# Patient Record
Sex: FEMALE | Race: BLACK OR AFRICAN AMERICAN | NOT HISPANIC OR LATINO
[De-identification: names, ages, dates, MRNs, and addresses within clinical notes are randomized per-mention and may not be internally consistent; named-entity substitution may affect disease eponyms.]

---

## 2018-02-01 ENCOUNTER — APPOINTMENT (OUTPATIENT)
Dept: CV DIAGNOSITCS | Facility: HOSPITAL | Age: 73
End: 2018-02-01

## 2018-02-15 ENCOUNTER — OUTPATIENT (OUTPATIENT)
Dept: OUTPATIENT SERVICES | Facility: HOSPITAL | Age: 73
LOS: 1 days | End: 2018-02-15

## 2018-02-15 ENCOUNTER — APPOINTMENT (OUTPATIENT)
Dept: CV DIAGNOSITCS | Facility: HOSPITAL | Age: 73
End: 2018-02-15
Payer: MEDICARE

## 2018-02-15 DIAGNOSIS — I34.0 NONRHEUMATIC MITRAL (VALVE) INSUFFICIENCY: ICD-10-CM

## 2018-02-15 PROCEDURE — 93320 DOPPLER ECHO COMPLETE: CPT | Mod: 26,GC

## 2018-02-15 PROCEDURE — 93312 ECHO TRANSESOPHAGEAL: CPT | Mod: 26

## 2018-02-15 PROCEDURE — 93325 DOPPLER ECHO COLOR FLOW MAPG: CPT | Mod: 26,GC

## 2022-09-02 ENCOUNTER — APPOINTMENT (OUTPATIENT)
Dept: ORTHOPEDIC SURGERY | Facility: CLINIC | Age: 77
End: 2022-09-02

## 2022-09-02 DIAGNOSIS — Z00.00 ENCOUNTER FOR GENERAL ADULT MEDICAL EXAMINATION W/OUT ABNORMAL FINDINGS: ICD-10-CM

## 2022-09-02 PROCEDURE — 99214 OFFICE O/P EST MOD 30 MIN: CPT

## 2022-09-02 PROCEDURE — 73630 X-RAY EXAM OF FOOT: CPT | Mod: RT

## 2022-09-02 PROCEDURE — 73600 X-RAY EXAM OF ANKLE: CPT | Mod: RT

## 2022-09-02 RX ORDER — MELOXICAM 15 MG/1
15 TABLET ORAL DAILY
Qty: 30 | Refills: 3 | Status: COMPLETED | COMMUNITY
Start: 2022-09-02 | End: 2022-12-31

## 2022-09-02 NOTE — HISTORY OF PRESENT ILLNESS
[de-identified] : Patient is a 77 year old female who presents for evaluation of her RT foot/ankle. Reports twisting injury early-mid August 2022. No formal treatment other than compression stockings recommended by vascular specialist. WB in sandals. Ice to affected area. H/O lumbar HNP and radiculopathy, and still complains of intermittent pain down the right leg.

## 2022-09-02 NOTE — ASSESSMENT
[FreeTextEntry1] : Patient was offered PT, but declined. Icing and home ROM exercises are recommended. She will return if symptoms do not improve.  Refill on meloxicam sent.  Icing prn.\par \par She was again recommended to go to pain management regarding her radiculopathy.

## 2022-09-02 NOTE — PHYSICAL EXAM
[NL (40)] : plantar flexion 40 degrees [NL 30)] : inversion 30 degrees [5___] : eversion 5[unfilled]/5 [2+] : posterior tibialis pulse: 2+ [Normal] : saphenous nerve sensation normal [] : no pain when stressing lateral tarsal metatarsal joint [Right] : right foot [Weight -] : weightbearing [There are no fractures, subluxations or dislocations. No significant abnormalities are seen] : There are no fractures, subluxations or dislocations. No significant abnormalities are seen [de-identified] : MIld midfoot djd [de-identified] : eversion 15 degrees [TWNoteComboBox7] : dorsiflexion 15 degrees

## 2022-09-16 ENCOUNTER — APPOINTMENT (OUTPATIENT)
Dept: PAIN MANAGEMENT | Facility: CLINIC | Age: 77
End: 2022-09-16

## 2022-09-16 VITALS — BODY MASS INDEX: 36.96 KG/M2 | WEIGHT: 230 LBS | HEIGHT: 66 IN

## 2022-09-16 PROCEDURE — 99204 OFFICE O/P NEW MOD 45 MIN: CPT

## 2022-09-16 NOTE — DISCUSSION/SUMMARY
[de-identified] : After discussing various treatment options with the patient including but not limited to oral medications, physical therapy, exercise modalities as well as interventional spinal injections, we have decided with the following plan:\par \par - Continue Home exercises, stretching, activity modification, physical therapy, and conservative care.\par - MRI report and/or images was reviewed and discussed with the patient.\par - Recommend L4-5 Lumbar Epidural Steroid Injection under fluoroscopic guidance with image.\par - The risks, benefits and alternatives of the proposed procedure were explained in detail with the patient. The risks outlined include but are not limited to infection, bleeding, post-dural puncture headache, nerve injury, a temporary increase in pain, failure to resolve symptoms, allergic reaction, symptom recurrence, and possible elevation of blood sugar in diabetics. All questions were answered to patient's apparent satisfaction and he/she verbalized an understanding.\par - Patient is presenting with acute/sub-acute radicular pain with impairment in ADLs and functionality.  The pain has not responded to conservative care including NSAID therapy and/or physical therapy.  There is no bleeding tendency, unstable medical condition, or systemic infection.\par - Follow up in 1-2 weeks post injection for re-evaluation.\par - Will call to schedule.\par *pt takes 81mg Aspirin\par  no

## 2022-09-16 NOTE — HISTORY OF PRESENT ILLNESS
[Lower back] : lower back [6] : 6 [4] : 4 [Dull/Aching] : dull/aching [Radiating] : radiating [Tightness] : tightness [Meds] : meds [Physical therapy] : physical therapy [Walking] : walking [Stairs] : stairs [] : no [FreeTextEntry1] : b/l  [FreeTextEntry6] : heavyness [FreeTextEntry7] : (b/l) mostly left groin, thigh to the knee  [FreeTextEntry9] : meloxicam  [de-identified] : lifting  [de-identified] : L MRI

## 2022-09-16 NOTE — PHYSICAL EXAM
[de-identified] : Constitutional; Appears well, no apparent distress\par Ability to communicate: Normal \par Respiratory: non-labored breathing\par Skin: No rash noted\par Head: Normocephalic, atraumatic\par Neck: no visible thyroid enlargement\par Eyes: Extraocular movements intact\par Neurologic: Alert and oriented x3\par Psychiatric: normal mood, affect and behavior \par \par  [Flexion] : flexion [Extension] : extension [] : light touch intact throughout both lower extremities

## 2022-10-28 LAB — SARS-COV-2 N GENE NPH QL NAA+PROBE: NOT DETECTED

## 2022-10-31 ENCOUNTER — NON-APPOINTMENT (OUTPATIENT)
Age: 77
End: 2022-10-31

## 2022-11-02 ENCOUNTER — APPOINTMENT (OUTPATIENT)
Age: 77
End: 2022-11-02

## 2022-11-02 PROCEDURE — 62323 NJX INTERLAMINAR LMBR/SAC: CPT

## 2022-11-25 ENCOUNTER — APPOINTMENT (OUTPATIENT)
Dept: PAIN MANAGEMENT | Facility: CLINIC | Age: 77
End: 2022-11-25

## 2022-11-25 VITALS — HEIGHT: 66 IN | WEIGHT: 230 LBS | BODY MASS INDEX: 36.96 KG/M2

## 2022-11-25 PROCEDURE — 99213 OFFICE O/P EST LOW 20 MIN: CPT

## 2022-11-25 NOTE — DISCUSSION/SUMMARY
[de-identified] : After discussing various treatment options with the patient including but not limited to oral medications, physical therapy, exercise modalities as well as interventional spinal injections, we have decided with the following plan:\par \par - Continue home exercises, stretching, activity modification, physical therapy, and conservative care.\par - Follow-up as needed.\par - Will provide prescription for Physical Therapy.\par - Recommend continue Meloxicam 15mg daily PRN. Potential adverse effects including but not limited to bleeding, ulcers, increased risk of hypertension, heart disease, kidney disease and stroke were discussed with the patient.  Medication would allow patient to be more mobile and perform ADL's.  Will continue to monitor patient and attempt to wean as soon as possible. Will use the lowest dosage possible for the shortest possible period of time.\par

## 2022-11-25 NOTE — PHYSICAL EXAM
Group Topic: BH Group OT    Date: 7/13/2021  Start Time: 1330  End Time: 1430  Facilitators: Catherine S Fahres, OT    Focus: Communication  Number in attendance: 3    Method: Group  Attendance: Present  Participation: Active  Patient Response: Appropriate feedback  Mood: Depressed  Affect: Type: subdued  Behavior/Socialization: Appropriate to group  Thought Process: Demonstrated insight and Tracking  Task Performance: Follows directions  Patient Evaluation: Independent - full participation       [de-identified] : Constitutional; Appears well, no apparent distress\par Ability to communicate: Normal \par Respiratory: non-labored breathing\par Skin: No rash noted\par Head: Normocephalic, atraumatic\par Neck: no visible thyroid enlargement\par Eyes: Extraocular movements intact\par Neurologic: Alert and oriented x3\par Psychiatric: normal mood, affect and behavior \par \par  [] : lumbar paraspinal tenderness

## 2022-11-25 NOTE — HISTORY OF PRESENT ILLNESS
[Lower back] : lower back [6] : 6 [4] : 4 [Dull/Aching] : dull/aching [Radiating] : radiating [Tightness] : tightness [Meds] : meds [Physical therapy] : physical therapy [Walking] : walking [Stairs] : stairs [] : no [FreeTextEntry1] : b/l  [FreeTextEntry6] : heavyness [FreeTextEntry7] : (b/l) mostly left groin, thigh to the knee  [FreeTextEntry9] : meloxicam  [de-identified] : lifting  [de-identified] : L MRI

## 2023-03-21 ENCOUNTER — NON-APPOINTMENT (OUTPATIENT)
Age: 78
End: 2023-03-21

## 2023-06-23 ENCOUNTER — APPOINTMENT (OUTPATIENT)
Dept: ORTHOPEDIC SURGERY | Facility: CLINIC | Age: 78
End: 2023-06-23
Payer: MEDICARE

## 2023-06-23 PROCEDURE — 99214 OFFICE O/P EST MOD 30 MIN: CPT

## 2023-06-23 PROCEDURE — 73562 X-RAY EXAM OF KNEE 3: CPT | Mod: RT

## 2023-06-23 RX ORDER — MELOXICAM 15 MG/1
15 TABLET ORAL DAILY
Qty: 30 | Refills: 3 | Status: COMPLETED | COMMUNITY
Start: 2023-06-23 | End: 2023-10-21

## 2023-06-23 NOTE — PHYSICAL EXAM
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

## 2023-06-23 NOTE — HISTORY OF PRESENT ILLNESS
[6] : 6 [Dull/Aching] : dull/aching [Sharp] : sharp [de-identified] : Patient is a 78 year old female here for evaluation of right knee. Patient states she has been having pain on her knee since mid June.  No trauma.. She as been taking Meloxicam 15mg.  She has seen  Dr. Angeles for her knee or right knee OA in the past.  WB in sneakers. [FreeTextEntry1] : right knee

## 2023-06-23 NOTE — ASSESSMENT
[FreeTextEntry1] : Steroid injection was offered, but patient declined.\par Meloxicam prescribed.\par The patient was explained the options as well as benefits of over the counter verses prescription strength nonsteroidal anti-inflammatory medication. The patient opts for a prescription strength medication.\par \par \par Follow up with Dr. Solorzano in one month

## 2023-10-27 ENCOUNTER — APPOINTMENT (OUTPATIENT)
Dept: ORTHOPEDIC SURGERY | Facility: CLINIC | Age: 78
End: 2023-10-27
Payer: MEDICARE

## 2023-10-27 DIAGNOSIS — E11.9 TYPE 2 DIABETES MELLITUS W/OUT COMPLICATIONS: ICD-10-CM

## 2023-10-27 DIAGNOSIS — S93.401A SPRAIN OF UNSPECIFIED LIGAMENT OF RIGHT ANKLE, INITIAL ENCOUNTER: ICD-10-CM

## 2023-10-27 DIAGNOSIS — I10 ESSENTIAL (PRIMARY) HYPERTENSION: ICD-10-CM

## 2023-10-27 DIAGNOSIS — E78.00 PURE HYPERCHOLESTEROLEMIA, UNSPECIFIED: ICD-10-CM

## 2023-10-27 PROCEDURE — 73610 X-RAY EXAM OF ANKLE: CPT | Mod: RT

## 2023-10-27 PROCEDURE — 99214 OFFICE O/P EST MOD 30 MIN: CPT

## 2023-10-27 RX ORDER — METOPROLOL SUCCINATE 100 MG/1
100 TABLET, EXTENDED RELEASE ORAL
Refills: 0 | Status: ACTIVE | COMMUNITY

## 2023-10-27 RX ORDER — METFORMIN HYDROCHLORIDE 625 MG/1
TABLET ORAL
Refills: 0 | Status: ACTIVE | COMMUNITY

## 2023-10-27 RX ORDER — ATORVASTATIN CALCIUM 80 MG/1
TABLET, FILM COATED ORAL
Refills: 0 | Status: ACTIVE | COMMUNITY

## 2023-12-01 ENCOUNTER — APPOINTMENT (OUTPATIENT)
Dept: ORTHOPEDIC SURGERY | Facility: CLINIC | Age: 78
End: 2023-12-01
Payer: MEDICARE

## 2023-12-01 PROCEDURE — 99213 OFFICE O/P EST LOW 20 MIN: CPT

## 2023-12-05 ENCOUNTER — EMERGENCY (EMERGENCY)
Facility: HOSPITAL | Age: 78
LOS: 0 days | Discharge: ANOTHER HOSPITAL | End: 2023-12-06
Attending: EMERGENCY MEDICINE
Payer: MEDICARE

## 2023-12-05 VITALS
RESPIRATION RATE: 18 BRPM | HEIGHT: 66 IN | DIASTOLIC BLOOD PRESSURE: 92 MMHG | SYSTOLIC BLOOD PRESSURE: 176 MMHG | TEMPERATURE: 99 F | HEART RATE: 100 BPM | OXYGEN SATURATION: 97 % | WEIGHT: 216.05 LBS

## 2023-12-05 DIAGNOSIS — Z98.890 OTHER SPECIFIED POSTPROCEDURAL STATES: ICD-10-CM

## 2023-12-05 DIAGNOSIS — Z79.82 LONG TERM (CURRENT) USE OF ASPIRIN: ICD-10-CM

## 2023-12-05 DIAGNOSIS — Z86.2 PERSONAL HISTORY OF DISEASES OF THE BLOOD AND BLOOD-FORMING ORGANS AND CERTAIN DISORDERS INVOLVING THE IMMUNE MECHANISM: ICD-10-CM

## 2023-12-05 DIAGNOSIS — K62.5 HEMORRHAGE OF ANUS AND RECTUM: ICD-10-CM

## 2023-12-05 DIAGNOSIS — I10 ESSENTIAL (PRIMARY) HYPERTENSION: ICD-10-CM

## 2023-12-05 DIAGNOSIS — E78.5 HYPERLIPIDEMIA, UNSPECIFIED: ICD-10-CM

## 2023-12-05 DIAGNOSIS — Z88.0 ALLERGY STATUS TO PENICILLIN: ICD-10-CM

## 2023-12-05 DIAGNOSIS — Z79.02 LONG TERM (CURRENT) USE OF ANTITHROMBOTICS/ANTIPLATELETS: ICD-10-CM

## 2023-12-05 DIAGNOSIS — K92.2 GASTROINTESTINAL HEMORRHAGE, UNSPECIFIED: ICD-10-CM

## 2023-12-05 DIAGNOSIS — Z87.19 PERSONAL HISTORY OF OTHER DISEASES OF THE DIGESTIVE SYSTEM: ICD-10-CM

## 2023-12-05 LAB
ALBUMIN SERPL ELPH-MCNC: 2.9 G/DL — LOW (ref 3.3–5)
ALBUMIN SERPL ELPH-MCNC: 2.9 G/DL — LOW (ref 3.3–5)
ALP SERPL-CCNC: 77 U/L — SIGNIFICANT CHANGE UP (ref 40–120)
ALP SERPL-CCNC: 77 U/L — SIGNIFICANT CHANGE UP (ref 40–120)
ALT FLD-CCNC: 14 U/L — SIGNIFICANT CHANGE UP (ref 12–78)
ALT FLD-CCNC: 14 U/L — SIGNIFICANT CHANGE UP (ref 12–78)
ANION GAP SERPL CALC-SCNC: 6 MMOL/L — SIGNIFICANT CHANGE UP (ref 5–17)
ANION GAP SERPL CALC-SCNC: 6 MMOL/L — SIGNIFICANT CHANGE UP (ref 5–17)
APTT BLD: 27.1 SEC — SIGNIFICANT CHANGE UP (ref 24.5–35.6)
APTT BLD: 27.1 SEC — SIGNIFICANT CHANGE UP (ref 24.5–35.6)
AST SERPL-CCNC: 11 U/L — LOW (ref 15–37)
AST SERPL-CCNC: 11 U/L — LOW (ref 15–37)
BASOPHILS # BLD AUTO: 0 K/UL — SIGNIFICANT CHANGE UP (ref 0–0.2)
BASOPHILS # BLD AUTO: 0 K/UL — SIGNIFICANT CHANGE UP (ref 0–0.2)
BASOPHILS NFR BLD AUTO: 0 % — SIGNIFICANT CHANGE UP (ref 0–2)
BASOPHILS NFR BLD AUTO: 0 % — SIGNIFICANT CHANGE UP (ref 0–2)
BILIRUB SERPL-MCNC: 0.3 MG/DL — SIGNIFICANT CHANGE UP (ref 0.2–1.2)
BILIRUB SERPL-MCNC: 0.3 MG/DL — SIGNIFICANT CHANGE UP (ref 0.2–1.2)
BUN SERPL-MCNC: 24 MG/DL — HIGH (ref 7–23)
BUN SERPL-MCNC: 24 MG/DL — HIGH (ref 7–23)
CALCIUM SERPL-MCNC: 8.5 MG/DL — SIGNIFICANT CHANGE UP (ref 8.5–10.1)
CALCIUM SERPL-MCNC: 8.5 MG/DL — SIGNIFICANT CHANGE UP (ref 8.5–10.1)
CHLORIDE SERPL-SCNC: 105 MMOL/L — SIGNIFICANT CHANGE UP (ref 96–108)
CHLORIDE SERPL-SCNC: 105 MMOL/L — SIGNIFICANT CHANGE UP (ref 96–108)
CO2 SERPL-SCNC: 26 MMOL/L — SIGNIFICANT CHANGE UP (ref 22–31)
CO2 SERPL-SCNC: 26 MMOL/L — SIGNIFICANT CHANGE UP (ref 22–31)
CREAT SERPL-MCNC: 0.97 MG/DL — SIGNIFICANT CHANGE UP (ref 0.5–1.3)
CREAT SERPL-MCNC: 0.97 MG/DL — SIGNIFICANT CHANGE UP (ref 0.5–1.3)
EGFR: 59 ML/MIN/1.73M2 — LOW
EGFR: 59 ML/MIN/1.73M2 — LOW
EOSINOPHIL # BLD AUTO: 0.29 K/UL — SIGNIFICANT CHANGE UP (ref 0–0.5)
EOSINOPHIL # BLD AUTO: 0.29 K/UL — SIGNIFICANT CHANGE UP (ref 0–0.5)
EOSINOPHIL NFR BLD AUTO: 4 % — SIGNIFICANT CHANGE UP (ref 0–6)
EOSINOPHIL NFR BLD AUTO: 4 % — SIGNIFICANT CHANGE UP (ref 0–6)
GLUCOSE SERPL-MCNC: 197 MG/DL — HIGH (ref 70–99)
GLUCOSE SERPL-MCNC: 197 MG/DL — HIGH (ref 70–99)
HCT VFR BLD CALC: 26 % — LOW (ref 34.5–45)
HCT VFR BLD CALC: 26 % — LOW (ref 34.5–45)
HCT VFR BLD CALC: 32.5 % — LOW (ref 34.5–45)
HCT VFR BLD CALC: 32.5 % — LOW (ref 34.5–45)
HGB BLD-MCNC: 10.4 G/DL — LOW (ref 11.5–15.5)
HGB BLD-MCNC: 10.4 G/DL — LOW (ref 11.5–15.5)
HGB BLD-MCNC: 8.2 G/DL — LOW (ref 11.5–15.5)
HGB BLD-MCNC: 8.2 G/DL — LOW (ref 11.5–15.5)
INR BLD: 1.01 RATIO — SIGNIFICANT CHANGE UP (ref 0.85–1.18)
INR BLD: 1.01 RATIO — SIGNIFICANT CHANGE UP (ref 0.85–1.18)
LACTATE SERPL-SCNC: 1.9 MMOL/L — SIGNIFICANT CHANGE UP (ref 0.7–2)
LACTATE SERPL-SCNC: 1.9 MMOL/L — SIGNIFICANT CHANGE UP (ref 0.7–2)
LIDOCAIN IGE QN: 8 U/L — LOW (ref 13–75)
LIDOCAIN IGE QN: 8 U/L — LOW (ref 13–75)
LYMPHOCYTES # BLD AUTO: 1.46 K/UL — SIGNIFICANT CHANGE UP (ref 1–3.3)
LYMPHOCYTES # BLD AUTO: 1.46 K/UL — SIGNIFICANT CHANGE UP (ref 1–3.3)
LYMPHOCYTES # BLD AUTO: 20 % — SIGNIFICANT CHANGE UP (ref 13–44)
LYMPHOCYTES # BLD AUTO: 20 % — SIGNIFICANT CHANGE UP (ref 13–44)
MCHC RBC-ENTMCNC: 22.9 PG — LOW (ref 27–34)
MCHC RBC-ENTMCNC: 22.9 PG — LOW (ref 27–34)
MCHC RBC-ENTMCNC: 23.2 PG — LOW (ref 27–34)
MCHC RBC-ENTMCNC: 23.2 PG — LOW (ref 27–34)
MCHC RBC-ENTMCNC: 31.5 G/DL — LOW (ref 32–36)
MCHC RBC-ENTMCNC: 31.5 G/DL — LOW (ref 32–36)
MCHC RBC-ENTMCNC: 32 G/DL — SIGNIFICANT CHANGE UP (ref 32–36)
MCHC RBC-ENTMCNC: 32 G/DL — SIGNIFICANT CHANGE UP (ref 32–36)
MCV RBC AUTO: 72.4 FL — LOW (ref 80–100)
MCV RBC AUTO: 72.4 FL — LOW (ref 80–100)
MCV RBC AUTO: 72.6 FL — LOW (ref 80–100)
MCV RBC AUTO: 72.6 FL — LOW (ref 80–100)
MONOCYTES # BLD AUTO: 0.51 K/UL — SIGNIFICANT CHANGE UP (ref 0–0.9)
MONOCYTES # BLD AUTO: 0.51 K/UL — SIGNIFICANT CHANGE UP (ref 0–0.9)
MONOCYTES NFR BLD AUTO: 7 % — SIGNIFICANT CHANGE UP (ref 2–14)
MONOCYTES NFR BLD AUTO: 7 % — SIGNIFICANT CHANGE UP (ref 2–14)
NEUTROPHILS # BLD AUTO: 4.9 K/UL — SIGNIFICANT CHANGE UP (ref 1.8–7.4)
NEUTROPHILS # BLD AUTO: 4.9 K/UL — SIGNIFICANT CHANGE UP (ref 1.8–7.4)
NEUTROPHILS NFR BLD AUTO: 67 % — SIGNIFICANT CHANGE UP (ref 43–77)
NEUTROPHILS NFR BLD AUTO: 67 % — SIGNIFICANT CHANGE UP (ref 43–77)
NRBC # BLD: 0 /100 WBCS — SIGNIFICANT CHANGE UP (ref 0–0)
NRBC # BLD: 0 /100 WBCS — SIGNIFICANT CHANGE UP (ref 0–0)
NRBC # BLD: SIGNIFICANT CHANGE UP /100 WBCS (ref 0–0)
NRBC # BLD: SIGNIFICANT CHANGE UP /100 WBCS (ref 0–0)
PLATELET # BLD AUTO: 232 K/UL — SIGNIFICANT CHANGE UP (ref 150–400)
PLATELET # BLD AUTO: 232 K/UL — SIGNIFICANT CHANGE UP (ref 150–400)
PLATELET # BLD AUTO: 252 K/UL — SIGNIFICANT CHANGE UP (ref 150–400)
PLATELET # BLD AUTO: 252 K/UL — SIGNIFICANT CHANGE UP (ref 150–400)
POTASSIUM SERPL-MCNC: 3.8 MMOL/L — SIGNIFICANT CHANGE UP (ref 3.5–5.3)
POTASSIUM SERPL-MCNC: 3.8 MMOL/L — SIGNIFICANT CHANGE UP (ref 3.5–5.3)
POTASSIUM SERPL-SCNC: 3.8 MMOL/L — SIGNIFICANT CHANGE UP (ref 3.5–5.3)
POTASSIUM SERPL-SCNC: 3.8 MMOL/L — SIGNIFICANT CHANGE UP (ref 3.5–5.3)
PROT SERPL-MCNC: 6.9 GM/DL — SIGNIFICANT CHANGE UP (ref 6–8.3)
PROT SERPL-MCNC: 6.9 GM/DL — SIGNIFICANT CHANGE UP (ref 6–8.3)
PROTHROM AB SERPL-ACNC: 12.1 SEC — SIGNIFICANT CHANGE UP (ref 9.5–13)
PROTHROM AB SERPL-ACNC: 12.1 SEC — SIGNIFICANT CHANGE UP (ref 9.5–13)
RBC # BLD: 3.58 M/UL — LOW (ref 3.8–5.2)
RBC # BLD: 3.58 M/UL — LOW (ref 3.8–5.2)
RBC # BLD: 4.49 M/UL — SIGNIFICANT CHANGE UP (ref 3.8–5.2)
RBC # BLD: 4.49 M/UL — SIGNIFICANT CHANGE UP (ref 3.8–5.2)
RBC # FLD: 24.2 % — HIGH (ref 10.3–14.5)
RBC # FLD: 24.2 % — HIGH (ref 10.3–14.5)
RBC # FLD: 24.4 % — HIGH (ref 10.3–14.5)
RBC # FLD: 24.4 % — HIGH (ref 10.3–14.5)
SODIUM SERPL-SCNC: 137 MMOL/L — SIGNIFICANT CHANGE UP (ref 135–145)
SODIUM SERPL-SCNC: 137 MMOL/L — SIGNIFICANT CHANGE UP (ref 135–145)
WBC # BLD: 7.32 K/UL — SIGNIFICANT CHANGE UP (ref 3.8–10.5)
WBC # BLD: 7.32 K/UL — SIGNIFICANT CHANGE UP (ref 3.8–10.5)
WBC # BLD: 7.62 K/UL — SIGNIFICANT CHANGE UP (ref 3.8–10.5)
WBC # BLD: 7.62 K/UL — SIGNIFICANT CHANGE UP (ref 3.8–10.5)
WBC # FLD AUTO: 7.32 K/UL — SIGNIFICANT CHANGE UP (ref 3.8–10.5)
WBC # FLD AUTO: 7.32 K/UL — SIGNIFICANT CHANGE UP (ref 3.8–10.5)
WBC # FLD AUTO: 7.62 K/UL — SIGNIFICANT CHANGE UP (ref 3.8–10.5)
WBC # FLD AUTO: 7.62 K/UL — SIGNIFICANT CHANGE UP (ref 3.8–10.5)

## 2023-12-05 PROCEDURE — 99285 EMERGENCY DEPT VISIT HI MDM: CPT

## 2023-12-05 PROCEDURE — 93010 ELECTROCARDIOGRAM REPORT: CPT

## 2023-12-05 PROCEDURE — 74174 CTA ABD&PLVS W/CONTRAST: CPT | Mod: 26,MA

## 2023-12-05 NOTE — ED PROVIDER NOTE - PHYSICAL EXAMINATION
GENERAL: Awake. Alert. NAD. Well nourished.  HEENT: NC/AT, PERRL, EOMI, Conjunctiva pink, no scleral icterus. Airway patent. Moist mucous membranes. Conjunctival pallor noted.  LUNGS: CTAB. No wheezes or rales noted.  CARDIAC:  RRR.  ABDOMEN: No masses noted. Soft, NT, ND, no rebound, no guarding.  Rectal exam: Chaperoned by RN and ED tech. Bright red blood noted oozing from rectum with blood clots. No melena noted.  EXT: No edema, no calf tenderness, distal pulses 2+ bilaterally  NEURO: Moving all extremities. Sensation and strength intact throughout.   SKIN: Warm and dry. Pallor noted.  PSYCH: Normal affect.

## 2023-12-05 NOTE — ED PROVIDER NOTE - PROGRESS NOTE DETAILS
pt. having decreasing pressure, active bloody BMS with clots  ordered 2 U PRBCs and 2 L of NS   will monitor  pending CTA and admission for GIB, per signout ICU refused pt. in favor of transfer for possible IR intervention   pt. is accepted to [facility] for transfer for possible IR intervention.  Pt. has refused blood products thus far, but accepts saline, will continue to convey the need for blood products.  Pt. understands her pressure is low and she has an active GIB and blood products could save her life--she's still apprehensive  both pt. and family member at bedside are both in agreement with transfer ICU refused pt. in favor of transfer for possible IR intervention   pt. is accepted to [facility] for transfer for possible IR intervention.  Pt. has refused blood products thus far, but accepts saline, will continue to convey the need for blood products.  Pt. understands her pressure is low and she has an active GIB and blood products could save her life--she's still apprehensive  both pt. and family member at bedside are both in agreement with transfer

## 2023-12-05 NOTE — ED ADULT NURSE NOTE - NSFALLRISKINTERV_ED_ALL_ED
Assistance OOB with selected safe patient handling equipment if applicable/Assistance with ambulation/Communicate fall risk and risk factors to all staff, patient, and family/Monitor gait and stability/Provide visual cue: yellow wristband, yellow gown, etc/Reinforce activity limits and safety measures with patient and family/Call bell, personal items and telephone in reach/Instruct patient to call for assistance before getting out of bed/chair/stretcher/Non-slip footwear applied when patient is off stretcher/[place] to call system/Physically safe environment - no spills, clutter or unnecessary equipment/Purposeful Proactive Rounding/Room/bathroom lighting operational, light cord in reach Assistance OOB with selected safe patient handling equipment if applicable/Assistance with ambulation/Communicate fall risk and risk factors to all staff, patient, and family/Monitor gait and stability/Provide visual cue: yellow wristband, yellow gown, etc/Reinforce activity limits and safety measures with patient and family/Call bell, personal items and telephone in reach/Instruct patient to call for assistance before getting out of bed/chair/stretcher/Non-slip footwear applied when patient is off stretcher/[place] to call system/Physically safe environment - no spills, clutter or unnecessary equipment/Purposeful Proactive Rounding/Room/bathroom lighting operational, light cord in reach

## 2023-12-05 NOTE — ED PROVIDER NOTE - OBJECTIVE STATEMENT
78F PMH Hypertension, hyperlipidemia, diverticulitis, anemia (receives iron transfusions), on aspirin 81 mg daily, anagrelide 1mg (recently increased to BID from once a day)  presenting to the emergency department with 2 hours of bright red blood per rectum, patient with active bleeding episode upon initial ED evaluation.  Patient denies abdominal pain, nausea, vomiting.  No fever, chills.  Patient reports having colonoscopy in the past which she was told was normal.  Denies hematuria, hematemesis.  Patient without lightheadedness, palpitations, chest pain.

## 2023-12-05 NOTE — ED PROVIDER NOTE - CLINICAL SUMMARY MEDICAL DECISION MAKING FREE TEXT BOX
78F PMH Hypertension, hyperlipidemia, diverticulitis, anemia (receives iron transfusions), on aspirin 81 mg daily, anagrelide 1mg (recently increased to BID from once a day)  presenting to the emergency department with 2 hours of bright red blood per rectum, patient with active bleeding episode upon initial ED evaluation. Given hx and physical, ddx includes but is not limited to diverticulosis, angiodysplasia, anemia, lower concern for upper GI bleed (no melena, pt with hx of diverticulitis). Pt hemodynamically stable on initial ED eval. Plan for labs, ct, tba

## 2023-12-05 NOTE — ED ADULT NURSE NOTE - OBJECTIVE STATEMENT
Pt is a 78y F Aox3 with a pmh of HTN, HLD, diverticulitis, and anemia. Pt reports bright red blood coming from the rectum starting 2 hours before coming to the ED. Pts actively bleeding while in the ED bathroom. Upon inspection pt is passing large clots from the rectum. Pt denies n/v/d, abdominal pain, cp, dizziness or lightheadedness.

## 2023-12-05 NOTE — ED ADULT TRIAGE NOTE - CHIEF COMPLAINT QUOTE
Patient states she had bright red rectal bleeding today. Patient denies n/v, denies pain. Pmh hld, dm, htn. Asp81.

## 2023-12-06 ENCOUNTER — INPATIENT (INPATIENT)
Facility: HOSPITAL | Age: 78
LOS: 1 days | Discharge: HOME | End: 2023-12-08
Attending: INTERNAL MEDICINE | Admitting: INTERNAL MEDICINE
Payer: MEDICARE

## 2023-12-06 VITALS
SYSTOLIC BLOOD PRESSURE: 155 MMHG | HEIGHT: 66 IN | OXYGEN SATURATION: 100 % | HEART RATE: 88 BPM | TEMPERATURE: 99 F | RESPIRATION RATE: 15 BRPM | DIASTOLIC BLOOD PRESSURE: 65 MMHG

## 2023-12-06 VITALS
TEMPERATURE: 98 F | OXYGEN SATURATION: 99 % | SYSTOLIC BLOOD PRESSURE: 131 MMHG | DIASTOLIC BLOOD PRESSURE: 78 MMHG | RESPIRATION RATE: 15 BRPM | HEART RATE: 90 BPM

## 2023-12-06 DIAGNOSIS — Z29.9 ENCOUNTER FOR PROPHYLACTIC MEASURES, UNSPECIFIED: ICD-10-CM

## 2023-12-06 DIAGNOSIS — E11.9 TYPE 2 DIABETES MELLITUS WITHOUT COMPLICATIONS: ICD-10-CM

## 2023-12-06 DIAGNOSIS — Z90.710 ACQUIRED ABSENCE OF BOTH CERVIX AND UTERUS: Chronic | ICD-10-CM

## 2023-12-06 DIAGNOSIS — K92.2 GASTROINTESTINAL HEMORRHAGE, UNSPECIFIED: ICD-10-CM

## 2023-12-06 DIAGNOSIS — I10 ESSENTIAL (PRIMARY) HYPERTENSION: ICD-10-CM

## 2023-12-06 DIAGNOSIS — D75.839 THROMBOCYTOSIS, UNSPECIFIED: ICD-10-CM

## 2023-12-06 LAB
ALBUMIN SERPL ELPH-MCNC: 2.7 G/DL — LOW (ref 3.3–5)
ALBUMIN SERPL ELPH-MCNC: 2.7 G/DL — LOW (ref 3.3–5)
ALP SERPL-CCNC: 56 U/L — SIGNIFICANT CHANGE UP (ref 40–120)
ALP SERPL-CCNC: 56 U/L — SIGNIFICANT CHANGE UP (ref 40–120)
ALT FLD-CCNC: 8 U/L — SIGNIFICANT CHANGE UP (ref 4–33)
ALT FLD-CCNC: 8 U/L — SIGNIFICANT CHANGE UP (ref 4–33)
ANION GAP SERPL CALC-SCNC: 12 MMOL/L — SIGNIFICANT CHANGE UP (ref 7–14)
ANION GAP SERPL CALC-SCNC: 12 MMOL/L — SIGNIFICANT CHANGE UP (ref 7–14)
APTT BLD: 23.2 SEC — LOW (ref 24.5–35.6)
APTT BLD: 23.2 SEC — LOW (ref 24.5–35.6)
AST SERPL-CCNC: 22 U/L — SIGNIFICANT CHANGE UP (ref 4–32)
AST SERPL-CCNC: 22 U/L — SIGNIFICANT CHANGE UP (ref 4–32)
BASOPHILS # BLD AUTO: 0.12 K/UL — SIGNIFICANT CHANGE UP (ref 0–0.2)
BASOPHILS # BLD AUTO: 0.12 K/UL — SIGNIFICANT CHANGE UP (ref 0–0.2)
BASOPHILS NFR BLD AUTO: 1.9 % — SIGNIFICANT CHANGE UP (ref 0–2)
BASOPHILS NFR BLD AUTO: 1.9 % — SIGNIFICANT CHANGE UP (ref 0–2)
BILIRUB SERPL-MCNC: 0.2 MG/DL — SIGNIFICANT CHANGE UP (ref 0.2–1.2)
BILIRUB SERPL-MCNC: 0.2 MG/DL — SIGNIFICANT CHANGE UP (ref 0.2–1.2)
BLD GP AB SCN SERPL QL: NEGATIVE — SIGNIFICANT CHANGE UP
BLD GP AB SCN SERPL QL: NEGATIVE — SIGNIFICANT CHANGE UP
BLOOD GAS VENOUS COMPREHENSIVE RESULT: SIGNIFICANT CHANGE UP
BLOOD GAS VENOUS COMPREHENSIVE RESULT: SIGNIFICANT CHANGE UP
BUN SERPL-MCNC: 23 MG/DL — SIGNIFICANT CHANGE UP (ref 7–23)
BUN SERPL-MCNC: 23 MG/DL — SIGNIFICANT CHANGE UP (ref 7–23)
CALCIUM SERPL-MCNC: 7.4 MG/DL — LOW (ref 8.4–10.5)
CALCIUM SERPL-MCNC: 7.4 MG/DL — LOW (ref 8.4–10.5)
CHLORIDE SERPL-SCNC: 105 MMOL/L — SIGNIFICANT CHANGE UP (ref 98–107)
CHLORIDE SERPL-SCNC: 105 MMOL/L — SIGNIFICANT CHANGE UP (ref 98–107)
CO2 SERPL-SCNC: 20 MMOL/L — LOW (ref 22–31)
CO2 SERPL-SCNC: 20 MMOL/L — LOW (ref 22–31)
CREAT SERPL-MCNC: 0.77 MG/DL — SIGNIFICANT CHANGE UP (ref 0.5–1.3)
CREAT SERPL-MCNC: 0.77 MG/DL — SIGNIFICANT CHANGE UP (ref 0.5–1.3)
EGFR: 78 ML/MIN/1.73M2 — SIGNIFICANT CHANGE UP
EGFR: 78 ML/MIN/1.73M2 — SIGNIFICANT CHANGE UP
EOSINOPHIL # BLD AUTO: 0 K/UL — SIGNIFICANT CHANGE UP (ref 0–0.5)
EOSINOPHIL # BLD AUTO: 0 K/UL — SIGNIFICANT CHANGE UP (ref 0–0.5)
EOSINOPHIL NFR BLD AUTO: 0 % — SIGNIFICANT CHANGE UP (ref 0–6)
EOSINOPHIL NFR BLD AUTO: 0 % — SIGNIFICANT CHANGE UP (ref 0–6)
GLUCOSE BLDC GLUCOMTR-MCNC: 146 MG/DL — HIGH (ref 70–99)
GLUCOSE BLDC GLUCOMTR-MCNC: 146 MG/DL — HIGH (ref 70–99)
GLUCOSE BLDC GLUCOMTR-MCNC: 151 MG/DL — HIGH (ref 70–99)
GLUCOSE BLDC GLUCOMTR-MCNC: 151 MG/DL — HIGH (ref 70–99)
GLUCOSE BLDC GLUCOMTR-MCNC: 172 MG/DL — HIGH (ref 70–99)
GLUCOSE SERPL-MCNC: 132 MG/DL — HIGH (ref 70–99)
GLUCOSE SERPL-MCNC: 132 MG/DL — HIGH (ref 70–99)
GLUCOSE SERPL-MCNC: 190 MG/DL — HIGH (ref 70–99)
GLUCOSE SERPL-MCNC: 190 MG/DL — HIGH (ref 70–99)
HCT VFR BLD CALC: 21.6 % — LOW (ref 34.5–45)
HCT VFR BLD CALC: 21.6 % — LOW (ref 34.5–45)
HCT VFR BLD CALC: 21.8 % — LOW (ref 34.5–45)
HCT VFR BLD CALC: 21.8 % — LOW (ref 34.5–45)
HCT VFR BLD CALC: 24 % — LOW (ref 34.5–45)
HCT VFR BLD CALC: 24 % — LOW (ref 34.5–45)
HCT VFR BLD CALC: 25.7 % — LOW (ref 34.5–45)
HCT VFR BLD CALC: 25.7 % — LOW (ref 34.5–45)
HGB BLD-MCNC: 7 G/DL — CRITICAL LOW (ref 11.5–15.5)
HGB BLD-MCNC: 7 G/DL — CRITICAL LOW (ref 11.5–15.5)
HGB BLD-MCNC: 7.5 G/DL — LOW (ref 11.5–15.5)
HGB BLD-MCNC: 7.5 G/DL — LOW (ref 11.5–15.5)
HGB BLD-MCNC: 8.1 G/DL — LOW (ref 11.5–15.5)
HGB BLD-MCNC: 8.1 G/DL — LOW (ref 11.5–15.5)
HGB BLD-MCNC: 8.3 G/DL — LOW (ref 11.5–15.5)
HGB BLD-MCNC: 8.3 G/DL — LOW (ref 11.5–15.5)
IANC: 5.22 K/UL — SIGNIFICANT CHANGE UP (ref 1.8–7.4)
IANC: 5.22 K/UL — SIGNIFICANT CHANGE UP (ref 1.8–7.4)
INR BLD: 1.18 RATIO — SIGNIFICANT CHANGE UP (ref 0.85–1.18)
INR BLD: 1.18 RATIO — SIGNIFICANT CHANGE UP (ref 0.85–1.18)
LACTATE SERPL-SCNC: 2.2 MMOL/L — HIGH (ref 0.5–2)
LYMPHOCYTES # BLD AUTO: 0.44 K/UL — LOW (ref 1–3.3)
LYMPHOCYTES # BLD AUTO: 0.44 K/UL — LOW (ref 1–3.3)
LYMPHOCYTES # BLD AUTO: 6.8 % — LOW (ref 13–44)
LYMPHOCYTES # BLD AUTO: 6.8 % — LOW (ref 13–44)
MCHC RBC-ENTMCNC: 23.1 PG — LOW (ref 27–34)
MCHC RBC-ENTMCNC: 23.1 PG — LOW (ref 27–34)
MCHC RBC-ENTMCNC: 24.6 PG — LOW (ref 27–34)
MCHC RBC-ENTMCNC: 24.6 PG — LOW (ref 27–34)
MCHC RBC-ENTMCNC: 25.4 PG — LOW (ref 27–34)
MCHC RBC-ENTMCNC: 25.4 PG — LOW (ref 27–34)
MCHC RBC-ENTMCNC: 25.8 PG — LOW (ref 27–34)
MCHC RBC-ENTMCNC: 25.8 PG — LOW (ref 27–34)
MCHC RBC-ENTMCNC: 32.1 GM/DL — SIGNIFICANT CHANGE UP (ref 32–36)
MCHC RBC-ENTMCNC: 32.1 GM/DL — SIGNIFICANT CHANGE UP (ref 32–36)
MCHC RBC-ENTMCNC: 32.3 GM/DL — SIGNIFICANT CHANGE UP (ref 32–36)
MCHC RBC-ENTMCNC: 32.3 GM/DL — SIGNIFICANT CHANGE UP (ref 32–36)
MCHC RBC-ENTMCNC: 33.8 GM/DL — SIGNIFICANT CHANGE UP (ref 32–36)
MCHC RBC-ENTMCNC: 33.8 GM/DL — SIGNIFICANT CHANGE UP (ref 32–36)
MCHC RBC-ENTMCNC: 34.7 GM/DL — SIGNIFICANT CHANGE UP (ref 32–36)
MCHC RBC-ENTMCNC: 34.7 GM/DL — SIGNIFICANT CHANGE UP (ref 32–36)
MCV RBC AUTO: 71.9 FL — LOW (ref 80–100)
MCV RBC AUTO: 71.9 FL — LOW (ref 80–100)
MCV RBC AUTO: 74.2 FL — LOW (ref 80–100)
MCV RBC AUTO: 74.2 FL — LOW (ref 80–100)
MCV RBC AUTO: 75.2 FL — LOW (ref 80–100)
MCV RBC AUTO: 75.2 FL — LOW (ref 80–100)
MCV RBC AUTO: 76.3 FL — LOW (ref 80–100)
MCV RBC AUTO: 76.3 FL — LOW (ref 80–100)
MONOCYTES # BLD AUTO: 0.25 K/UL — SIGNIFICANT CHANGE UP (ref 0–0.9)
MONOCYTES # BLD AUTO: 0.25 K/UL — SIGNIFICANT CHANGE UP (ref 0–0.9)
MONOCYTES NFR BLD AUTO: 3.9 % — SIGNIFICANT CHANGE UP (ref 2–14)
MONOCYTES NFR BLD AUTO: 3.9 % — SIGNIFICANT CHANGE UP (ref 2–14)
NEUTROPHILS # BLD AUTO: 5.05 K/UL — SIGNIFICANT CHANGE UP (ref 1.8–7.4)
NEUTROPHILS # BLD AUTO: 5.05 K/UL — SIGNIFICANT CHANGE UP (ref 1.8–7.4)
NEUTROPHILS NFR BLD AUTO: 77.7 % — HIGH (ref 43–77)
NEUTROPHILS NFR BLD AUTO: 77.7 % — HIGH (ref 43–77)
NRBC # BLD: 0 /100 WBCS — SIGNIFICANT CHANGE UP (ref 0–0)
NRBC # FLD: 0 K/UL — SIGNIFICANT CHANGE UP (ref 0–0)
NRBC # FLD: 0.03 K/UL — HIGH (ref 0–0)
NRBC # FLD: 0.03 K/UL — HIGH (ref 0–0)
PLATELET # BLD AUTO: 195 K/UL — SIGNIFICANT CHANGE UP (ref 150–400)
PLATELET # BLD AUTO: 195 K/UL — SIGNIFICANT CHANGE UP (ref 150–400)
PLATELET # BLD AUTO: 207 K/UL — SIGNIFICANT CHANGE UP (ref 150–400)
PLATELET # BLD AUTO: 207 K/UL — SIGNIFICANT CHANGE UP (ref 150–400)
PLATELET # BLD AUTO: 210 K/UL — SIGNIFICANT CHANGE UP (ref 150–400)
PLATELET # BLD AUTO: 210 K/UL — SIGNIFICANT CHANGE UP (ref 150–400)
PLATELET # BLD AUTO: 212 K/UL — SIGNIFICANT CHANGE UP (ref 150–400)
PLATELET # BLD AUTO: 212 K/UL — SIGNIFICANT CHANGE UP (ref 150–400)
POTASSIUM SERPL-MCNC: 4.6 MMOL/L — SIGNIFICANT CHANGE UP (ref 3.5–5.3)
POTASSIUM SERPL-MCNC: 4.6 MMOL/L — SIGNIFICANT CHANGE UP (ref 3.5–5.3)
POTASSIUM SERPL-SCNC: 4.6 MMOL/L — SIGNIFICANT CHANGE UP (ref 3.5–5.3)
POTASSIUM SERPL-SCNC: 4.6 MMOL/L — SIGNIFICANT CHANGE UP (ref 3.5–5.3)
PROT SERPL-MCNC: 5.2 G/DL — LOW (ref 6–8.3)
PROT SERPL-MCNC: 5.2 G/DL — LOW (ref 6–8.3)
PROTHROM AB SERPL-ACNC: 13.1 SEC — HIGH (ref 9.5–13)
PROTHROM AB SERPL-ACNC: 13.1 SEC — HIGH (ref 9.5–13)
RBC # BLD: 2.91 M/UL — LOW (ref 3.8–5.2)
RBC # BLD: 2.91 M/UL — LOW (ref 3.8–5.2)
RBC # BLD: 3.03 M/UL — LOW (ref 3.8–5.2)
RBC # BLD: 3.03 M/UL — LOW (ref 3.8–5.2)
RBC # BLD: 3.19 M/UL — LOW (ref 3.8–5.2)
RBC # BLD: 3.19 M/UL — LOW (ref 3.8–5.2)
RBC # BLD: 3.37 M/UL — LOW (ref 3.8–5.2)
RBC # BLD: 3.37 M/UL — LOW (ref 3.8–5.2)
RBC # FLD: 23 % — HIGH (ref 10.3–14.5)
RBC # FLD: 23 % — HIGH (ref 10.3–14.5)
RBC # FLD: 23.3 % — HIGH (ref 10.3–14.5)
RBC # FLD: 23.3 % — HIGH (ref 10.3–14.5)
RBC # FLD: 23.9 % — HIGH (ref 10.3–14.5)
RBC # FLD: 23.9 % — HIGH (ref 10.3–14.5)
RBC # FLD: 24.5 % — HIGH (ref 10.3–14.5)
RBC # FLD: 24.5 % — HIGH (ref 10.3–14.5)
RH IG SCN BLD-IMP: POSITIVE — SIGNIFICANT CHANGE UP
SODIUM SERPL-SCNC: 137 MMOL/L — SIGNIFICANT CHANGE UP (ref 135–145)
SODIUM SERPL-SCNC: 137 MMOL/L — SIGNIFICANT CHANGE UP (ref 135–145)
WBC # BLD: 6.5 K/UL — SIGNIFICANT CHANGE UP (ref 3.8–10.5)
WBC # BLD: 7.24 K/UL — SIGNIFICANT CHANGE UP (ref 3.8–10.5)
WBC # BLD: 7.24 K/UL — SIGNIFICANT CHANGE UP (ref 3.8–10.5)
WBC # BLD: 8.92 K/UL — SIGNIFICANT CHANGE UP (ref 3.8–10.5)
WBC # BLD: 8.92 K/UL — SIGNIFICANT CHANGE UP (ref 3.8–10.5)
WBC # FLD AUTO: 6.5 K/UL — SIGNIFICANT CHANGE UP (ref 3.8–10.5)
WBC # FLD AUTO: 7.24 K/UL — SIGNIFICANT CHANGE UP (ref 3.8–10.5)
WBC # FLD AUTO: 7.24 K/UL — SIGNIFICANT CHANGE UP (ref 3.8–10.5)
WBC # FLD AUTO: 8.92 K/UL — SIGNIFICANT CHANGE UP (ref 3.8–10.5)
WBC # FLD AUTO: 8.92 K/UL — SIGNIFICANT CHANGE UP (ref 3.8–10.5)

## 2023-12-06 PROCEDURE — 99223 1ST HOSP IP/OBS HIGH 75: CPT | Mod: GC

## 2023-12-06 PROCEDURE — 99223 1ST HOSP IP/OBS HIGH 75: CPT

## 2023-12-06 PROCEDURE — 99291 CRITICAL CARE FIRST HOUR: CPT | Mod: FS

## 2023-12-06 RX ORDER — OLMESARTAN MEDOXOMIL / AMLODIPINE BESYLATE / HYDROCHLOROTHIAZIDE 40; 10; 25 MG/1; MG/1; MG/1
0 TABLET, FILM COATED ORAL
Qty: 0 | Refills: 0 | DISCHARGE

## 2023-12-06 RX ORDER — SENNA PLUS 8.6 MG/1
0 TABLET ORAL
Qty: 0 | Refills: 0 | DISCHARGE

## 2023-12-06 RX ORDER — SOD SULF/SODIUM/NAHCO3/KCL/PEG
4000 SOLUTION, RECONSTITUTED, ORAL ORAL ONCE
Refills: 0 | Status: COMPLETED | OUTPATIENT
Start: 2023-12-06 | End: 2023-12-06

## 2023-12-06 RX ORDER — DEXTROSE 50 % IN WATER 50 %
15 SYRINGE (ML) INTRAVENOUS ONCE
Refills: 0 | Status: DISCONTINUED | OUTPATIENT
Start: 2023-12-06 | End: 2023-12-08

## 2023-12-06 RX ORDER — FERROUS GLUCONATE 100 %
0 POWDER (GRAM) MISCELLANEOUS
Qty: 0 | Refills: 0 | DISCHARGE

## 2023-12-06 RX ORDER — DEXTROSE 50 % IN WATER 50 %
12.5 SYRINGE (ML) INTRAVENOUS ONCE
Refills: 0 | Status: DISCONTINUED | OUTPATIENT
Start: 2023-12-06 | End: 2023-12-08

## 2023-12-06 RX ORDER — SODIUM CHLORIDE 9 MG/ML
1000 INJECTION, SOLUTION INTRAVENOUS
Refills: 0 | Status: DISCONTINUED | OUTPATIENT
Start: 2023-12-06 | End: 2023-12-08

## 2023-12-06 RX ORDER — ASPIRIN/CALCIUM CARB/MAGNESIUM 324 MG
1 TABLET ORAL
Refills: 0 | DISCHARGE

## 2023-12-06 RX ORDER — INSULIN LISPRO 100/ML
VIAL (ML) SUBCUTANEOUS AT BEDTIME
Refills: 0 | Status: DISCONTINUED | OUTPATIENT
Start: 2023-12-06 | End: 2023-12-08

## 2023-12-06 RX ORDER — SODIUM CHLORIDE 9 MG/ML
2000 INJECTION INTRAMUSCULAR; INTRAVENOUS; SUBCUTANEOUS ONCE
Refills: 0 | Status: COMPLETED | OUTPATIENT
Start: 2023-12-06 | End: 2023-12-06

## 2023-12-06 RX ORDER — INSULIN LISPRO 100/ML
VIAL (ML) SUBCUTANEOUS
Refills: 0 | Status: DISCONTINUED | OUTPATIENT
Start: 2023-12-06 | End: 2023-12-08

## 2023-12-06 RX ORDER — GLUCAGON INJECTION, SOLUTION 0.5 MG/.1ML
1 INJECTION, SOLUTION SUBCUTANEOUS ONCE
Refills: 0 | Status: DISCONTINUED | OUTPATIENT
Start: 2023-12-06 | End: 2023-12-08

## 2023-12-06 RX ORDER — ANAGRELIDE HCL 0.5 MG
0 CAPSULE ORAL
Qty: 0 | Refills: 0 | DISCHARGE

## 2023-12-06 RX ORDER — METOPROLOL TARTRATE 50 MG
0 TABLET ORAL
Qty: 0 | Refills: 0 | DISCHARGE

## 2023-12-06 RX ORDER — DEXTROSE 50 % IN WATER 50 %
25 SYRINGE (ML) INTRAVENOUS ONCE
Refills: 0 | Status: DISCONTINUED | OUTPATIENT
Start: 2023-12-06 | End: 2023-12-08

## 2023-12-06 RX ORDER — METFORMIN HYDROCHLORIDE 850 MG/1
0 TABLET ORAL
Qty: 0 | Refills: 0 | DISCHARGE

## 2023-12-06 RX ORDER — LANOLIN ALCOHOL/MO/W.PET/CERES
3 CREAM (GRAM) TOPICAL AT BEDTIME
Refills: 0 | Status: DISCONTINUED | OUTPATIENT
Start: 2023-12-06 | End: 2023-12-08

## 2023-12-06 RX ADMIN — Medication 4000 MILLILITER(S): at 22:57

## 2023-12-06 RX ADMIN — SODIUM CHLORIDE 2000 MILLILITER(S): 9 INJECTION INTRAMUSCULAR; INTRAVENOUS; SUBCUTANEOUS at 00:43

## 2023-12-06 RX ADMIN — Medication 1: at 17:42

## 2023-12-06 RX ADMIN — Medication 1: at 12:50

## 2023-12-06 NOTE — H&P ADULT - PROBLEM SELECTOR PLAN 1
Presenting with active GI hemorrhage with CTA showing active contrast extravasation terminal ileum/cecum.  - Hgb rapidly dropping 10.4->7.0 consistent with active bleeding   - transfuse 2U PRBC, 1U platelets.   - maintain two large bore IVs   - GI/IR consulted, f/u recs   - hold ASA, anagrelide  - NPO   - monitor CBCs q4hrs Presenting with active GI hemorrhage with CTA showing active contrast extravasation terminal ileum/cecum.  - Hgb rapidly dropping 10.4->7.0 consistent with active bleeding   - transfuse 2U PRBC, 1U platelets.   - consider IVF after transfusion   - maintain two large bore IVs   - GI/IR consulted, f/u recs   - hold ASA, anagrelide  - NPO   - monitor CBCs q4hrs

## 2023-12-06 NOTE — ED ADULT NURSE REASSESSMENT NOTE - NS ED NURSE REASSESS COMMENT FT1
Float RN: pt assisted off bedpan and noted to have filled bedpan w/ blood from rectum. pt BP is stable, pt denies dizziness and headaches at this time. MD Bush at bedside. Plan of care ongoing.

## 2023-12-06 NOTE — H&P ADULT - NSHPREVIEWOFSYSTEMS_GEN_ALL_CORE
CONSTITUTIONAL: No fevers or chills  EYES/ENT: No vision changes or eye pain  NECK: No neck pain or stiffness.  RESPIRATORY: No shortness of breath. No cough, wheezing.  CARDIOVASCULAR: No chest pain or palpitations  GASTROINTESTINAL: No abdominal pain. No nausea, vomiting; No diarrhea  GENITOURINARY: No dysuria or hematuria  NEUROLOGICAL: No numbness or weakness  ENDO: No polyuria or polydipsia.   SKIN: No itching, rashes.    All other review of systems is negative unless indicated above.

## 2023-12-06 NOTE — CONSULT NOTE ADULT - SUBJECTIVE AND OBJECTIVE BOX
HPI: 78W with PMH significant for but not limited to HTN/HLD, diverticulosis, anemia, thrombocythemia (on ASA 81mg and anagrelide) presenting as a transfer from [facility] for [facility], found to have CT AP Active GI bleed within the terminal ileum/cecum.    Patient reports that she had been doing well up until yesterday evening when she had bright red blood per rectum. Patient reports bright red/maroon blood in the toilet, no stool. Her last stool movement was yesterday morning. Patient had second bloody stool this morning with maroon colored stool. Patient reports that this has not happened to her previously. No other symptoms at this time.     Last colonoscopy in 1/2023 with diverticulosis, performed by Dr. Scherer in [place].     Allergies:  penicillin (Unknown)      Home Medications:    Hospital Medications:  dextrose 5%. 1000 milliLiter(s) IV Continuous <Continuous>  dextrose 5%. 1000 milliLiter(s) IV Continuous <Continuous>  dextrose 50% Injectable 25 Gram(s) IV Push once  dextrose 50% Injectable 25 Gram(s) IV Push once  dextrose 50% Injectable 12.5 Gram(s) IV Push once  dextrose Oral Gel 15 Gram(s) Oral once PRN  glucagon  Injectable 1 milliGRAM(s) IntraMuscular once  insulin lispro (ADMELOG) corrective regimen sliding scale   SubCutaneous at bedtime  insulin lispro (ADMELOG) corrective regimen sliding scale   SubCutaneous three times a day before meals  melatonin 3 milliGRAM(s) Oral at bedtime PRN      PMHX/PSHX:  Hypertension    Diabetes mellitus    Type 2 diabetes mellitus    Thrombocythemia    Chronic constipation    S/P hysterectomy        Family history:  No pertinent family history in first degree relatives        Denies family history of colon cancer/polyps, stomach cancer/polyps, pancreatic cancer/masses, liver cancer/disease, ovarian cancer and endometrial cancer.    Social History:   Tob: Denies  EtOH: Denies  Illicit Drugs: Denies    ROS:     General:  No wt loss, fevers, chills, night sweats, fatigue  Eyes:  Good vision, no reported pain  ENT:  No sore throat, pain, runny nose, dysphagia  CV:  No pain, palpitations, hypo/hypertension  Pulm:  No dyspnea, cough, tachypnea, wheezing  GI:  see HPI  :  No pain, bleeding, incontinence, nocturia  Muscle:  No pain, weakness  Neuro:  No weakness, tingling, memory problems  Psych:  No fatigue, insomnia, mood problems, depression  Endocrine:  No polyuria, polydipsia, cold/heat intolerance  Heme:  No petechiae, ecchymosis, easy bruisability  Skin:  No rash, tattoos, scars, edema    PHYSICAL EXAM:     GENERAL:  No acute distress, patient appears comfortable   HEENT:  NCAT, no scleral icterus   CHEST:  no respiratory distress  HEART:  Regular rate and rhythm  ABDOMEN:  Soft, non-tender, non-distended, no masses  EXTREMITIES: No edema  SKIN:  No rash/erythema/ecchymoses/petechiae/wounds/abscess/warm/dry  NEURO:  Alert and oriented x 3    Vital Signs:  Vital Signs Last 24 Hrs  T(C): 36.5 (06 Dec 2023 09:00), Max: 37.2 (06 Dec 2023 01:49)  T(F): 97.7 (06 Dec 2023 09:00), Max: 98.9 (06 Dec 2023 01:49)  HR: 94 (06 Dec 2023 09:00) (86 - 102)  BP: 132/56 (06 Dec 2023 09:00) (103/58 - 176/92)  BP(mean): --  RR: 16 (06 Dec 2023 09:00) (15 - 26)  SpO2: 100% (06 Dec 2023 09:00) (97% - 100%)    Parameters below as of 06 Dec 2023 09:00  Patient On (Oxygen Delivery Method): room air      Daily Height in cm: 167.64 (06 Dec 2023 01:49)    Daily     LABS:                        7.0    6.50  )-----------( 207      ( 06 Dec 2023 02:00 )             21.8     Mean Cell Volume: 71.9 fL (12-06-23 @ 02:00)    12-06    137  |  105  |  23  ----------------------------<  190<H>  4.6   |  20<L>  |  0.77    Ca    7.4<L>      06 Dec 2023 02:00    TPro  5.2<L>  /  Alb  2.7<L>  /  TBili  0.2  /  DBili  x   /  AST  22  /  ALT  8   /  AlkPhos  56  12-06    LIVER FUNCTIONS - ( 06 Dec 2023 02:00 )  Alb: 2.7 g/dL / Pro: 5.2 g/dL / ALK PHOS: 56 U/L / ALT: 8 U/L / AST: 22 U/L / GGT: x           PT/INR - ( 06 Dec 2023 02:00 )   PT: 13.1 sec;   INR: 1.18 ratio         PTT - ( 06 Dec 2023 02:00 )  PTT:23.2 sec  Urinalysis Basic - ( 06 Dec 2023 02:00 )    Color: x / Appearance: x / SG: x / pH: x  Gluc: 190 mg/dL / Ketone: x  / Bili: x / Urobili: x   Blood: x / Protein: x / Nitrite: x   Leuk Esterase: x / RBC: x / WBC x   Sq Epi: x / Non Sq Epi: x / Bacteria: x      Amylase Serum--      Lipase serum8       Ammonia--                          7.0    6.50  )-----------( 207      ( 06 Dec 2023 02:00 )             21.8                         8.2    7.62  )-----------( 232      ( 05 Dec 2023 23:00 )             26.0                         10.4   7.32  )-----------( 252      ( 05 Dec 2023 20:40 )             32.5       Imaging:             HPI: 78W with PMH significant for but not limited to HTN/HLD, diverticulosis, anemia, thrombocythemia (on ASA 81mg and anagrelide) presenting as a transfer from [facility] for [facility], found to have CT AP Active GI bleed within the terminal ileum/cecum.    Patient reports that she had been doing well up until yesterday evening when she had bright red blood per rectum. Patient reports bright red/maroon blood in the toilet, no stool. Her last stool movement was yesterday morning. Patient had second bloody stool this morning with maroon colored stool. Patient reports that this has not happened to her previously. No other symptoms at this time.     Last colonoscopy in 1/2023 with diverticulosis, performed by Dr. Scherer in [place].     Allergies:  penicillin (Unknown)      Home Medications:    Hospital Medications:  dextrose 5%. 1000 milliLiter(s) IV Continuous <Continuous>  dextrose 5%. 1000 milliLiter(s) IV Continuous <Continuous>  dextrose 50% Injectable 25 Gram(s) IV Push once  dextrose 50% Injectable 25 Gram(s) IV Push once  dextrose 50% Injectable 12.5 Gram(s) IV Push once  dextrose Oral Gel 15 Gram(s) Oral once PRN  glucagon  Injectable 1 milliGRAM(s) IntraMuscular once  insulin lispro (ADMELOG) corrective regimen sliding scale   SubCutaneous at bedtime  insulin lispro (ADMELOG) corrective regimen sliding scale   SubCutaneous three times a day before meals  melatonin 3 milliGRAM(s) Oral at bedtime PRN      PMHX/PSHX:  Hypertension    Diabetes mellitus    Type 2 diabetes mellitus    Thrombocythemia    Chronic constipation    S/P hysterectomy        Family history:  No pertinent family history in first degree relatives        Denies family history of colon cancer/polyps, stomach cancer/polyps, pancreatic cancer/masses, liver cancer/disease, ovarian cancer and endometrial cancer.    Social History:   Tob: Denies  EtOH: Denies  Illicit Drugs: Denies    ROS:     General:  No wt loss, fevers, chills, night sweats, fatigue  Eyes:  Good vision, no reported pain  ENT:  No sore throat, pain, runny nose, dysphagia  CV:  No pain, palpitations, hypo/hypertension  Pulm:  No dyspnea, cough, tachypnea, wheezing  GI:  see HPI  :  No pain, bleeding, incontinence, nocturia  Muscle:  No pain, weakness  Neuro:  No weakness, tingling, memory problems  Psych:  No fatigue, insomnia, mood problems, depression  Endocrine:  No polyuria, polydipsia, cold/heat intolerance  Heme:  No petechiae, ecchymosis, easy bruisability  Skin:  No rash, tattoos, scars, edema    PHYSICAL EXAM:     GENERAL:  No acute distress, patient appears comfortable   HEENT:  NCAT, no scleral icterus   CHEST:  no respiratory distress  HEART:  Regular rate and rhythm  ABDOMEN:  Soft, non-tender, non-distended, no masses  EXTREMITIES: No edema  SKIN:  No rash/erythema/ecchymoses/petechiae/wounds/abscess/warm/dry  NEURO:  Alert and oriented x 3    Vital Signs:  Vital Signs Last 24 Hrs  T(C): 36.5 (06 Dec 2023 09:00), Max: 37.2 (06 Dec 2023 01:49)  T(F): 97.7 (06 Dec 2023 09:00), Max: 98.9 (06 Dec 2023 01:49)  HR: 94 (06 Dec 2023 09:00) (86 - 102)  BP: 132/56 (06 Dec 2023 09:00) (103/58 - 176/92)  BP(mean): --  RR: 16 (06 Dec 2023 09:00) (15 - 26)  SpO2: 100% (06 Dec 2023 09:00) (97% - 100%)    Parameters below as of 06 Dec 2023 09:00  Patient On (Oxygen Delivery Method): room air      Daily Height in cm: 167.64 (06 Dec 2023 01:49)    Daily     LABS:                        7.0    6.50  )-----------( 207      ( 06 Dec 2023 02:00 )             21.8     Mean Cell Volume: 71.9 fL (12-06-23 @ 02:00)    12-06    137  |  105  |  23  ----------------------------<  190<H>  4.6   |  20<L>  |  0.77    Ca    7.4<L>      06 Dec 2023 02:00    TPro  5.2<L>  /  Alb  2.7<L>  /  TBili  0.2  /  DBili  x   /  AST  22  /  ALT  8   /  AlkPhos  56  12-06    LIVER FUNCTIONS - ( 06 Dec 2023 02:00 )  Alb: 2.7 g/dL / Pro: 5.2 g/dL / ALK PHOS: 56 U/L / ALT: 8 U/L / AST: 22 U/L / GGT: x           PT/INR - ( 06 Dec 2023 02:00 )   PT: 13.1 sec;   INR: 1.18 ratio         PTT - ( 06 Dec 2023 02:00 )  PTT:23.2 sec  Urinalysis Basic - ( 06 Dec 2023 02:00 )    Color: x / Appearance: x / SG: x / pH: x  Gluc: 190 mg/dL / Ketone: x  / Bili: x / Urobili: x   Blood: x / Protein: x / Nitrite: x   Leuk Esterase: x / RBC: x / WBC x   Sq Epi: x / Non Sq Epi: x / Bacteria: x      Amylase Serum--      Lipase serum8       Ammonia--                          7.0    6.50  )-----------( 207      ( 06 Dec 2023 02:00 )             21.8                         8.2    7.62  )-----------( 232      ( 05 Dec 2023 23:00 )             26.0                         10.4   7.32  )-----------( 252      ( 05 Dec 2023 20:40 )             32.5       Imaging:             HPI: 78W with PMH significant for but not limited to HTN/HLD, diverticulosis, anemia, thrombocythemia (on ASA 81mg and anagrelide) presenting as a transfer from [facility] for [facility], found to have CT AP Active GI bleed within the terminal ileum/cecum.    Patient reports that she had been doing well up until yesterday evening when she had bright red blood per rectum. Patient reports bright red/maroon blood in the toilet, no stool. Her last stool movement was yesterday morning. Patient had second bloody stool this morning with maroon colored stool. Patient reports that this has not happened to her previously. No other symptoms at this time.     Last colonoscopy in 1/2023 with diverticulosis, performed by Dr. Scherer in [place].     Allergies:  penicillin (Unknown)      Home Medications:    Hospital Medications:  dextrose 5%. 1000 milliLiter(s) IV Continuous <Continuous>  dextrose 5%. 1000 milliLiter(s) IV Continuous <Continuous>  dextrose 50% Injectable 25 Gram(s) IV Push once  dextrose 50% Injectable 25 Gram(s) IV Push once  dextrose 50% Injectable 12.5 Gram(s) IV Push once  dextrose Oral Gel 15 Gram(s) Oral once PRN  glucagon  Injectable 1 milliGRAM(s) IntraMuscular once  insulin lispro (ADMELOG) corrective regimen sliding scale   SubCutaneous at bedtime  insulin lispro (ADMELOG) corrective regimen sliding scale   SubCutaneous three times a day before meals  melatonin 3 milliGRAM(s) Oral at bedtime PRN      PMHX/PSHX:  Hypertension    Diabetes mellitus    Type 2 diabetes mellitus    Thrombocythemia    Chronic constipation    S/P hysterectomy        Family history:  No pertinent family history in first degree relatives    ROS: See HPI    PHYSICAL EXAM:     GENERAL:  No acute distress, patient appears comfortable   HEENT:  NCAT, no scleral icterus   CHEST:  no respiratory distress  HEART:  Regular rate and rhythm  ABDOMEN:  Soft, non-tender, non-distended, no masses  EXTREMITIES: No edema  SKIN:  No rash/erythema/ecchymoses/petechiae/wounds/abscess/warm/dry  NEURO:  Alert and oriented x 3    Vital Signs:  Vital Signs Last 24 Hrs  T(C): 36.5 (06 Dec 2023 09:00), Max: 37.2 (06 Dec 2023 01:49)  T(F): 97.7 (06 Dec 2023 09:00), Max: 98.9 (06 Dec 2023 01:49)  HR: 94 (06 Dec 2023 09:00) (86 - 102)  BP: 132/56 (06 Dec 2023 09:00) (103/58 - 176/92)  BP(mean): --  RR: 16 (06 Dec 2023 09:00) (15 - 26)  SpO2: 100% (06 Dec 2023 09:00) (97% - 100%)    Parameters below as of 06 Dec 2023 09:00  Patient On (Oxygen Delivery Method): room air      Daily Height in cm: 167.64 (06 Dec 2023 01:49)    Daily     LABS:                        7.0    6.50  )-----------( 207      ( 06 Dec 2023 02:00 )             21.8     Mean Cell Volume: 71.9 fL (12-06-23 @ 02:00)    12-06    137  |  105  |  23  ----------------------------<  190<H>  4.6   |  20<L>  |  0.77    Ca    7.4<L>      06 Dec 2023 02:00    TPro  5.2<L>  /  Alb  2.7<L>  /  TBili  0.2  /  DBili  x   /  AST  22  /  ALT  8   /  AlkPhos  56  12-06    LIVER FUNCTIONS - ( 06 Dec 2023 02:00 )  Alb: 2.7 g/dL / Pro: 5.2 g/dL / ALK PHOS: 56 U/L / ALT: 8 U/L / AST: 22 U/L / GGT: x           PT/INR - ( 06 Dec 2023 02:00 )   PT: 13.1 sec;   INR: 1.18 ratio         PTT - ( 06 Dec 2023 02:00 )  PTT:23.2 sec  Urinalysis Basic - ( 06 Dec 2023 02:00 )    Color: x / Appearance: x / SG: x / pH: x  Gluc: 190 mg/dL / Ketone: x  / Bili: x / Urobili: x   Blood: x / Protein: x / Nitrite: x   Leuk Esterase: x / RBC: x / WBC x   Sq Epi: x / Non Sq Epi: x / Bacteria: x      Amylase Serum--      Lipase serum8       Ammonia--                          7.0    6.50  )-----------( 207      ( 06 Dec 2023 02:00 )             21.8                         8.2    7.62  )-----------( 232      ( 05 Dec 2023 23:00 )             26.0                         10.4   7.32  )-----------( 252      ( 05 Dec 2023 20:40 )             32.5       Imaging:    < from: CT Angio Abdomen and Pelvis w/ IV Cont (12.05.23 @ 22:37) >    IMPRESSION:  Active GI bleed within the terminal ileum/cecum.  Colonic diverticulosis.  Liquid stool throughout the colon, which may reflect diarrhea.        < end of copied text >           HPI: 78W with PMH significant for but not limited to HTN/HLD, diverticulosis, anemia, thrombocythemia (on ASA 81mg and anagrelide) presenting as a transfer from [facility] for [facility], found to have CT AP Active GI bleed within the terminal ileum/cecum.    Patient reports that she had been doing well up until yesterday evening when she had bright red blood per rectum. Patient reports bright red/maroon blood in the toilet, no stool. Her last stool movement was yesterday morning. Patient had second bloody stool this morning with maroon colored stool. Patient reports that this has not happened to her previously. No other symptoms at this time.     Last colonoscopy in 1/2023 with diverticulosis, performed by Dr. Scherer in [place].     Allergies:  penicillin (Unknown)      Home Medications:    Hospital Medications:  dextrose 5%. 1000 milliLiter(s) IV Continuous <Continuous>  dextrose 5%. 1000 milliLiter(s) IV Continuous <Continuous>  dextrose 50% Injectable 25 Gram(s) IV Push once  dextrose 50% Injectable 25 Gram(s) IV Push once  dextrose 50% Injectable 12.5 Gram(s) IV Push once  dextrose Oral Gel 15 Gram(s) Oral once PRN  glucagon  Injectable 1 milliGRAM(s) IntraMuscular once  insulin lispro (ADMELOG) corrective regimen sliding scale   SubCutaneous at bedtime  insulin lispro (ADMELOG) corrective regimen sliding scale   SubCutaneous three times a day before meals  melatonin 3 milliGRAM(s) Oral at bedtime PRN      PMHX/PSHX:  Hypertension    Diabetes mellitus    Type 2 diabetes mellitus    Thrombocythemia    Chronic constipation    S/P hysterectomy        Family history:  No pertinent family history in first degree relatives    ROS: See HPI    PHYSICAL EXAM:     GENERAL:  No acute distress, patient appears comfortable   HEENT:  NCAT, no scleral icterus   CHEST:  no respiratory distress  HEART:  Regular rate and rhythm  ABDOMEN:  Soft, non-tender, non-distended, no masses  EXTREMITIES: No edema  SKIN:  No rash/erythema/ecchymoses/petechiae/wounds/abscess/warm/dry  NEURO:  Alert and oriented x 3    Vital Signs:  Vital Signs Last 24 Hrs  T(C): 36.5 (06 Dec 2023 09:00), Max: 37.2 (06 Dec 2023 01:49)  T(F): 97.7 (06 Dec 2023 09:00), Max: 98.9 (06 Dec 2023 01:49)  HR: 94 (06 Dec 2023 09:00) (86 - 102)  BP: 132/56 (06 Dec 2023 09:00) (103/58 - 176/92)  BP(mean): --  RR: 16 (06 Dec 2023 09:00) (15 - 26)  SpO2: 100% (06 Dec 2023 09:00) (97% - 100%)    Parameters below as of 06 Dec 2023 09:00  Patient On (Oxygen Delivery Method): room air      Daily Height in cm: 167.64 (06 Dec 2023 01:49)    Daily     LABS:                        7.0    6.50  )-----------( 207      ( 06 Dec 2023 02:00 )             21.8     Mean Cell Volume: 71.9 fL (12-06-23 @ 02:00)    12-06    137  |  105  |  23  ----------------------------<  190<H>  4.6   |  20<L>  |  0.77    Ca    7.4<L>      06 Dec 2023 02:00    TPro  5.2<L>  /  Alb  2.7<L>  /  TBili  0.2  /  DBili  x   /  AST  22  /  ALT  8   /  AlkPhos  56  12-06    LIVER FUNCTIONS - ( 06 Dec 2023 02:00 )  Alb: 2.7 g/dL / Pro: 5.2 g/dL / ALK PHOS: 56 U/L / ALT: 8 U/L / AST: 22 U/L / GGT: x           PT/INR - ( 06 Dec 2023 02:00 )   PT: 13.1 sec;   INR: 1.18 ratio         PTT - ( 06 Dec 2023 02:00 )  PTT:23.2 sec  Urinalysis Basic - ( 06 Dec 2023 02:00 )    Color: x / Appearance: x / SG: x / pH: x  Gluc: 190 mg/dL / Ketone: x  / Bili: x / Urobili: x   Blood: x / Protein: x / Nitrite: x   Leuk Esterase: x / RBC: x / WBC x   Sq Epi: x / Non Sq Epi: x / Bacteria: x      Amylase Serum--      Lipase serum8       Ammonia--                          7.0    6.50  )-----------( 207      ( 06 Dec 2023 02:00 )             21.8                         8.2    7.62  )-----------( 232      ( 05 Dec 2023 23:00 )             26.0                         10.4   7.32  )-----------( 252      ( 05 Dec 2023 20:40 )             32.5       Imaging:    < from: CT Angio Abdomen and Pelvis w/ IV Cont (12.05.23 @ 22:37) >    IMPRESSION:  Active GI bleed within the terminal ileum/cecum.  Colonic diverticulosis.  Liquid stool throughout the colon, which may reflect diarrhea.        < end of copied text >

## 2023-12-06 NOTE — CHART NOTE - NSCHARTNOTEFT_GEN_A_CORE
79 yo female with hx of HTN/HLD, diverticulitis, anemia (receives iron transfusions), on ASA 81mg, presenting as a transfer from [facility] for [facility]. CTA with active extravasation at terminal lilium. IR consulted for evaluation of GI bleed.     Pt HD stable s/p 2u pRBC in ED with hgb 7.0>8.3. Planned for colonoscopy with GI tomorrow.     - No plan for embolization at this time, agree with colonoscopy tomorrow   - Continue trend cbc/vitals, transfuse prn per primary team  - If patient demonstrates acute decompensation/hemodynamic instability not responsive to fluid resuscitation, or if hemoglobin continues to drop significantly despite transfusion, please [place] [phone] for re-evaluation for intervention.     --  Homer Weston MD, PGY-3  Vascular and Interventional Radiology   Available on Microsoft Teams    - Non-emergent consults: Place IR consult order in [place]  - Emergent issues (pager): [facility] [phone]; [facility] [phone]; [phone]  - Scheduling questions: [facility] [phone]; [facility] [phone]  - Clinic/outpatient booking: [facility] [phone]; [facility] [phone] 79 yo female with hx of HTN/HLD, diverticulitis, anemia (receives iron transfusions), on ASA 81mg, presenting as a transfer from [facility] for [facility]. CTA with active extravasation at terminal lilium. IR consulted for evaluation of GI bleed.     Pt HD stable s/p 2u pRBC in ED with hgb 7.0>8.3. Planned for colonoscopy with GI tomorrow.     - No plan for embolization at this time, agree with colonoscopy tomorrow   - Continue trend cbc/vitals, transfuse prn per primary team  - If patient demonstrates acute decompensation/hemodynamic instability not responsive to fluid resuscitation, or if hemoglobin continues to drop significantly despite transfusion, please page IR at [phone] for re-evaluation for intervention.     --  Homer Weston MD, PGY-3  Vascular and Interventional Radiology   Available on Microsoft Teams    - Non-emergent consults: Place IR consult order in [place]  - Emergent issues (pager): [facility] [phone]; [facility] [phone]; [phone]  - Scheduling questions: [facility] [phone]; [facility] [phone]  - Clinic/outpatient booking: [facility] [phone]; [facility] [phone] 77 yo female with hx of HTN/HLD, diverticulitis, anemia (receives iron transfusions), on ASA 81mg, presenting as a transfer from [facility] for [facility]. CTA with active extravasation at terminal lilium. IR consulted for evaluation of GI bleed.     Pt HD stable s/p 3u pRBC in ED, 1u plt with hgb 7.0>8.3. Planned for colonoscopy with GI tomorrow.     - No plan for embolization at this time, agree with colonoscopy tomorrow   - Continue trend cbc/vitals, transfuse prn per primary team  - If patient demonstrates acute decompensation/hemodynamic instability not responsive to fluid resuscitation, or if hemoglobin continues to drop significantly despite transfusion, please page [place] at [phone] for re-evaluation for intervention.     --  Homer Weston MD, PGY-3  Vascular and Interventional Radiology   Available on Microsoft Teams    - Non-emergent consults: Place IR consult order in [place]  - Emergent issues (pager): [facility] [phone]; [facility] [phone]; [phone]  - Scheduling questions: [facility] [phone]; [facility] [phone]  - Clinic/outpatient booking: [facility] [phone]; [facility] [phone] 77 yo female with hx of HTN/HLD, diverticulitis, anemia (receives iron transfusions), on ASA 81mg, presenting as a transfer from [facility] for [facility]. CTA with active extravasation at terminal lilium. IR consulted for evaluation of GI bleed.     Pt HD stable s/p 3u pRBC in ED, 1u plt with hgb 7.0>8.3. Planned for colonoscopy with GI tomorrow.     - No plan for embolization at this time, agree with colonoscopy tomorrow   - Continue trend cbc/vitals, transfuse prn per primary team  - If patient demonstrates acute decompensation/hemodynamic instability not responsive to fluid resuscitation, or if hemoglobin continues to drop significantly despite transfusion, please page IR at [phone] for re-evaluation for intervention.     --  Homer Weston MD, PGY-3  Vascular and Interventional Radiology   Available on Microsoft Teams    - Non-emergent consults: Place IR consult order in [place]  - Emergent issues (pager): [facility] [phone]; [facility] [phone]; [phone]  - Scheduling questions: [facility] [phone]; [facility] [phone]  - Clinic/outpatient booking: [facility] [phone]; [facility] [phone]

## 2023-12-06 NOTE — H&P ADULT - NSHPPHYSICALEXAM_GEN_ALL_CORE
PHYSICAL EXAM:  Vital Signs Last 24 Hrs  T(C): 36.5 (12-06-23 @ 09:00)  T(F): 97.7 (12-06-23 @ 09:00), Max: 98.9 (12-06-23 @ 01:49)  HR: 94 (12-06-23 @ 09:00) (86 - 102)  BP: 132/56 (12-06-23 @ 09:00)  BP(mean): --  RR: 16 (12-06-23 @ 09:00) (15 - 26)  SpO2: 100% (12-06-23 @ 09:00) (97% - 100%)  Wt(kg): --    General: elderly woman laying in bed appears comfortable NAD, awake and alert  Eyes: PERRLA, nonicteric sclera  HENMT: NCAT, MMM, upper dentures in place, Mallampati III  Neck: -JVD, no thyromegaly, trachea midline   Respiratory: No respiratory distress, CTABL, No rales, rhonchi, wheezing.  Cardiovascular: Regular rate and rhythm; No m/g/r. 2+ peripheral pulses in BLEs  Gastrointestinal: Soft, Nontender, Nondistended; +BS. No palpable organomegaly  Extremities: No c/c/e; warm to touch  Neurological: Speech fluent/face symmetric. Moving all 4 extremities; Sensation to LT grossly in tact.  Psych: AAOx3; appropriate mood and affect

## 2023-12-06 NOTE — ED ADULT NURSE NOTE - CHIEF COMPLAINT QUOTE
transferred from [facility] for GI bleed. Pt alert and oriented. No complaints of chest pain, headache, nausea, dizziness, vomiting  SOB, fever, chills verbalized. phx DM2 HTN transferred from [facility] for GI bleed. Pt alert and oriented. No complaints of chest pain, headache, nausea, dizziness, vomiting  SOB, fever, chills verbalized. phx DM2 HTN

## 2023-12-06 NOTE — H&P ADULT - PROBLEM/PLAN-3
[Dear  ___] : Dear  [unfilled], [Courtesy Letter:] : I had the pleasure of seeing your patient, [unfilled], in my office today. [Please see my note below.] : Please see my note below. [Consult Closing:] : Thank you very much for allowing me to participate in the care of this patient.  If you have any questions, please do not hesitate to contact me. [Sincerely,] : Sincerely, DISPLAY PLAN FREE TEXT [FreeTextEntry2] : Dr. Tiana Walls  [FreeTextEntry3] : Camilo Garrett MD\par Director of Thoracic, [facility]\par Cardiovascular & Thoracic Surgery\par [address] [place] [address] \[place] [address]\par Tel: [phone]\par Fax: [phone]\par

## 2023-12-06 NOTE — CHART NOTE - NSCHARTNOTEFT_GEN_A_CORE
In summary, patient is 78 year old woman with a PMHx of HTN, HLD, DM, diverticulosis and anemia. Patient takes lopressor, Aspirin and anagrelide. She presented to the ED with complaints of BRBPR. Patient states around 7 pm she noticed a moderate amount of dark colored stool with clots when she went to the bathroom. In the ED, patient BP initially 176/92, . Hgb 10.4 around 8pm. Patient then had 2 more episodes of bloody BM in the ED. Repeat Hgb 8.2. BP dropped to 108/74 (86) and . CTA abdomen/pelvis reported Active GI bleed within the terminal ileum/cecum and Colonic diverticulosis.   ICU called for evaluation. Case discussed with EICU attending Dr. Chairez. Unfortunately IR services are not available at this time in [place]. Advised ED physician Dr. Reagan to transfer patient to a facility with IR services. In summary, patient is 78 year old woman with a PMHx of HTN, HLD, DM, diverticulosis and anemia. Patient takes lopressor, Aspirin and anagrelide. She presented to the ED with complaints of BRBPR. Patient states around 7 pm she noticed a moderate amount of dark colored stool with clots when she went to the bathroom. In the ED, patient BP initially 176/92, . Hgb 10.4 around 8pm. Patient then had 2 more episodes of bloody BM in the ED. Repeat Hgb 8.2. BP dropped to 108/74 (86) and . CTA abdomen/pelvis reported Active GI bleed within the terminal ileum/cecum and Colonic diverticulosis.   ICU called for evaluation. Case discussed with EICU attending Dr. Chairez. Unfortunately IR services are not available at this time in [place]. Advised ED physician Dr. Reagan to transfer patient to a facility with IR services. In summary, patient is 78 year old woman with a PMHx of HTN, HLD, DM, diverticulosis and anemia. Patient takes lopressor, Aspirin and anagrelide. She presented to the ED with complaints of BRBPR. Patient states around 7 pm she noticed a moderate amount of dark colored stool with clots when she went to the bathroom. In the ED, patient BP initially 176/92, . Hgb 10.4 around 8pm. Patient then had 2 more episodes of bloody BM in the ED. Repeat Hgb 8.2. BP dropped to 108/74 (86) and . CTA abdomen/pelvis reported Active GI bleed within the terminal ileum/cecum and Colonic diverticulosis.   ICU called for evaluation. Case discussed with EICU attending Dr. Chairez. Unfortunately IR services are not available at this time in [place]. Advised ED physician Dr. Reagan to transfer patient to a facility with IR services.      =================  EICU Attending Addendum    Case reviewed, with notable active GI BRBPR with multiple bowel movements in ED and CT angio noting active bleed in terminal ileum and cecum.  Recommend transfer to tertiary care hospital for IR (currently not available at this time) and GI.  Transfuse PRBCs given Hemoglobin already downtrending 2 units in ~2 hours.      Trend: 8.2 g/dL (12-05-23 @ 23:00) | 10.4 g/dL (12-05-23 @ 20:40)    Hematocrit Trend:   26.0 % (12-05-23 @ 23:00) | 32.5 % (12-05-23 @ 20:40)    Case discussed with ANSLEY Chairez MD  E-ICU Attending Physician In summary, patient is 78 year old woman with a PMHx of HTN, HLD, DM, diverticulosis and anemia. Patient takes lopressor, Aspirin and anagrelide. She presented to the ED with complaints of BRBPR. Patient states around 7 pm she noticed a moderate amount of dark colored stool with clots when she went to the bathroom. In the ED, patient BP initially 176/92, . Hgb 10.4 around 8pm. Patient then had 2 more episodes of bloody BM in the ED. Repeat Hgb 8.2. BP dropped to 108/74 (86) and . CTA abdomen/pelvis reported Active GI bleed within the terminal ileum/cecum and Colonic diverticulosis.   ICU called for evaluation. Case discussed with EICU attending Dr. Chairez. Unfortunately IR services are not available at this time in [place]. Advised ED physician Dr. Reagan to transfer patient to a facility with IR services.      =================  EICU Attending Addendum    Case reviewed, with notable active GI BRBPR with multiple bowel movements in ED and CT angio noting active bleed in terminal ileum and cecum.  Recommend transfer to tertiary care hospital for IR (currently not available at this time) and GI.  Transfuse PRBCs given Hemoglobin already downtrending 2 units in ~2 hours.      Trend: 8.2 g/dL (12-05-23 @ 23:00) | 10.4 g/dL (12-05-23 @ 20:40)    Hematocrit Trend:   26.0 % (12-05-23 @ 23:00) | 32.5 % (12-05-23 @ 20:40)    Case discussed with ANSLEY Chairez MD  E-ICU Attending Physician

## 2023-12-06 NOTE — ED ADULT TRIAGE NOTE - CHIEF COMPLAINT QUOTE
transferred from [facility] for GI bleed. Pt alert and oriented. No complaints of chest pain, headache, nausea, dizziness, vomiting  SOB, fever, chills verbalized. phx DM2 HTN

## 2023-12-06 NOTE — H&P ADULT - PROBLEM SELECTOR PLAN 2
Home medications: tribenzor (olmasartan/amlodipine/HCTZ) 40/5/12.5, metoprolol  daily   - holding home antihypertensives in setting of active bleeding  - monitor BP, restart medications PRN

## 2023-12-06 NOTE — ED PROVIDER NOTE - CLINICAL SUMMARY MEDICAL DECISION MAKING FREE TEXT BOX
78F PMH Hypertension, hyperlipidemia, diverticulitis, anemia (receives iron transfusions), on aspirin 81 mg daily, anagrelide 1mg (recently increased to BID from once a day)  presenting to the emergency department with 2 hours of bright red blood per rectum, patient with active bleeding episode upon initial ED evaluation at [facility]. Pt has stable vital signs upon arrival to [facility]. Plan is labs, discuss with IR. 78F PMH Hypertension, hyperlipidemia, diverticulitis, anemia (receives iron transfusions), on aspirin 81 mg daily, anagrelide 1mg (recently increased to BID from once a day)  presenting to the emergency department with 2 hours of bright red blood per rectum, patient with active bleeding episode upon initial ED evaluation at [facility]. Pt has stable vital signs upon arrival to [facility]. Plan is labs, discuss with IR. 78F PMH Hypertension, hyperlipidemia, diverticulitis, anemia (receives iron transfusions), on aspirin 81 mg daily, anagrelide 1mg (recently increased to BID from once a day)  presenting to the emergency department with 2 hours of bright red blood per rectum, patient with active bleeding episode upon initial ED evaluation at [facility]. Pt has stable vital signs upon arrival to [facility]. Plan is labs, discuss with IR, admission. 78F PMH Hypertension, hyperlipidemia, diverticulitis, anemia (receives iron transfusions), on aspirin 81 mg daily, anagrelide 1mg (recently increased to BID from once a day)  presenting to the emergency department with 2 hours of bright red blood per rectum, patient with active bleeding episode upon initial ED evaluation at [facility]. Pt has stable vital signs upon arrival to [facility]. Plan is labs, discuss with IR, admission.

## 2023-12-06 NOTE — CHART NOTE - NSCHARTNOTEFT_GEN_A_CORE
Outpatient colonoscopy results from 03/2023 at [facility] placed in chart.    Yash Cotter PA-C  pager [phone] Outpatient colonoscopy results from 03/2023 at [facility] placed in chart.    Yash Cotter PA-C  pager [phone]

## 2023-12-06 NOTE — ED PROVIDER NOTE - PROGRESS NOTE DETAILS
ANSLEY Gagnon: spoke to IR, consult order placed, will eval imaging and lab values. Patient was admitted to the hospital however still in the emergency department when she had a large bowel movement consisting of mostly blood and a bedpan.  Her blood pressure remained stable in the 140 systolic range.  I discussed with the hospitalist's who is aware of the findings.  With discussion I ordered another unit of packed red blood cells.  There is no indication for emergent IR intervention at this time as patient is still hemodynamically stable.  Will continue to monitor until patient goes upstairs

## 2023-12-06 NOTE — CONSULT NOTE ADULT - ASSESSMENT
78W with PMH significant for but not limited to HTN/HLD, diverticulosis, anemia, thrombocythemia (on ASA 81mg and anagrelide) presenting as a transfer from [facility] for BRBPR, found to have CT AP Active GI bleed within the terminal ileum/cecum.    #BRBPR  Most likely diverticular bleed. LGIB also includes angioectasias, malignancy, colitis, hemorrhoids, or SCAD [segmental colitis associated with diverticulosis].  - last colonoscopy in 01/2023 with diverticulosis (no records)   - CTA with active GI bleed within the terminal ileum/cecum.    Recommendations:   - transfuse for Hgb > 8 (in preparation for anesthesia)   - plan for colonoscopy tomorrow for assessment of lower GIB   - discussed risks of procedure, including but not limited to, risks of bleeding, infection and perforation   - if patient were to develop HD instability from GIB today, would recommend IR intervention   - please obtain colonoscopy records    - rest of care per primary team     Instructions for colonoscopy  - clear liquid diet today, NPO at midnight  - please ensure golytely is completed (to be ordered by GI fellow)  - please ensure patient drinks entire prep  - please ensure morning labs are drawn at 4am, electrolytes repleted as necessary, and Hg>8  - rest of care per primary team    All recommendations are tentative until note is attested by attending.     Janie Lira, PGY-5  Gastroenterology/Hepatology Fellow  Available on Microsoft Teams  [phone] ([facility] Short Range Pager)  [phone] ([facility] Long Range Pager)    After 5pm, please contact the on-call GI fellow. [phone] 78W with PMH significant for but not limited to HTN/HLD, diverticulosis, anemia, thrombocythemia (on ASA 81mg and anagrelide) presenting as a transfer from [facility] for BRBPR, found to have CT AP Active GI bleed within the terminal ileum/cecum.    #BRBPR  Most likely diverticular bleed. LGIB also includes angioectasias, malignancy, colitis, hemorrhoids, or SCAD [segmental colitis associated with diverticulosis].  - last colonoscopy in 01/2023 with diverticulosis (no records)   - CTA with active GI bleed within the terminal ileum/cecum.    Recommendations:   - transfuse for Hgb > 8 (in preparation for anesthesia)   - plan for colonoscopy tomorrow for assessment of lower GIB   - discussed risks of procedure, including but not limited to, risks of bleeding, infection and perforation   - if patient were to develop HD instability from GIB today, would recommend IR intervention   - please obtain colonoscopy records    - rest of care per primary team     Instructions for colonoscopy  - clear liquid diet today, NPO at midnight  - please ensure golytely is completed (to be ordered by GI fellow)  - please ensure patient drinks entire prep  - please ensure morning labs are drawn at 4am, electrolytes repleted as necessary, and Hg>8  - rest of care per primary team    All recommendations are tentative until note is attested by attending.     Janie Lira, PGY-5  Gastroenterology/Hepatology Fellow  Available on Microsoft Teams  [phone] ([facility] Short Range Pager)  [phone] ([facility] Long Range Pager)    After 5pm, please contact the on-call GI fellow. [phone]

## 2023-12-06 NOTE — ED ADULT NURSE NOTE - NSFALLUNIVINTERV_ED_ALL_ED
Bed/Stretcher in lowest position, wheels locked, appropriate side rails in place/Call bell, personal items and telephone in reach/Instruct patient to call for assistance before getting out of bed/chair/stretcher/Non-slip footwear applied when patient is off stretcher/[place] to call system/Physically safe environment - no spills, clutter or unnecessary equipment/Purposeful proactive rounding/Room/bathroom lighting operational, light cord in reach Bed/Stretcher in lowest position, wheels locked, appropriate side rails in place/Call bell, personal items and telephone in reach/Instruct patient to call for assistance before getting out of bed/chair/stretcher/Non-slip footwear applied when patient is off stretcher/[place] to call system/Physically safe environment - no spills, clutter or unnecessary equipment/Purposeful proactive rounding/Room/bathroom lighting operational, light cord in reach

## 2023-12-06 NOTE — ED PROVIDER NOTE - CRITICAL CARE ATTENDING CONTRIBUTION TO CARE
78-year-old female past medical history of hyperlipidemia diverticulitis anemia receiving iron transfusions presenting in transfer from [facility] for active GI bleed.  Patient had presented initially with bright red blood per rectum.  They did a CTA which demonstrated an active bleed.  She is being transferred here for definitive management by interventional radiology.  The patient's hemoglobin started at 10 and dropped to 8 at the outside hospital.  However she does refuse blood transfusion at this time.  Her blood pressure was also reported to be trending down to 100 systolic but now is back in the 130s.  She denies any pain or other complaints at this time.    Vitals: I have reviewed the patients vital signs  General: nontoxic appearing  HEENT: Atraumatic, normocephalic, airway patent  Eyes: EOMI, tracking appropriately  Neck: no tracheal deviation  Chest/Lungs: no trauma, symmetric chest rise, speaking in complete sentences,  no resp distress  Heart: skin and extremities well perfused, regular rate and rhythm  Neuro: A+Ox3, appears non focal  MSK: no deformities  Skin: no cyanosis, no jaundice   Psych:  Normal mood and affect  Abdomen: Soft nontender nondistended    78-year-old female past medical history of hyperlipidemia diverticulitis hypertension presenting with an active GI bleed.  Patient's hemoglobin is already dropped 2 g she continues to have bloody bowel movements.  I anticipate her hemoglobin to be even lower.  I had a prolonged discussion with the patient with regards to risks and benefits of transfusion and she is amenable at this time.  Once I get the repeat hemoglobin I will start the transfusion process.  Patient will need consultation by IR for definitive management.    Upon my evaluation, this patient had a high probability of imminent or life-threatening deterioration due to anemia from active GI bleed, which required my direct attention, intervention, and personal management.  The patient has a  medical condition that impairs one or more vital organ systems.  Frequent personal assessment and adjustment of medical interventions was performed.      I have personally provided 70 minutes of critical care time exclusive of time spent on separately billable procedures. Time includes review of laboratory data, radiology results, discussion with consultants, patient and family; monitoring for potential decompensation, as well as time spent retrieving data and reviewing the chart and documenting the visit. Interventions were performed as documented above.     I have personally performed a face to face diagnostic evaluation on this patient. I have reviewed the ACP note and agree with the history, exam and plan of care, except as noted. 78-year-old female past medical history of hyperlipidemia diverticulitis anemia receiving iron transfusions presenting in transfer from [facility] for active GI bleed.  Patient had presented initially with bright red blood per rectum.  They did a CTA which demonstrated an active bleed.  She is being transferred here for definitive management by interventional radiology.  The patient's hemoglobin started at 10 and dropped to 8 at the outside hospital.  However she does refuse blood transfusion at this time.  Her blood pressure was also reported to be trending down to 100 systolic but now is back in the 130s.  She denies any pain or other complaints at this time.    Vitals: I have reviewed the patients vital signs  General: nontoxic appearing  HEENT: Atraumatic, normocephalic, airway patent  Eyes: EOMI, tracking appropriately  Neck: no tracheal deviation  Chest/Lungs: no trauma, symmetric chest rise, speaking in complete sentences,  no resp distress  Heart: skin and extremities well perfused, regular rate and rhythm  Neuro: A+Ox3, appears non focal  MSK: no deformities  Skin: no cyanosis, no jaundice   Psych:  Normal mood and affect  Abdomen: Soft nontender nondistended    78-year-old female past medical history of hyperlipidemia diverticulitis hypertension presenting with an active GI bleed.  Patient's hemoglobin is already dropped 2 g she continues to have bloody bowel movements.  I anticipate her hemoglobin to be even lower.  I had a prolonged discussion with the patient with regards to risks and benefits of transfusion and she is amenable at this time.  Once I get the repeat hemoglobin I will start the transfusion process.  Patient will need consultation by IR for definitive management.    Upon my evaluation, this patient had a high probability of imminent or life-threatening deterioration due to anemia from active GI bleed, which required my direct attention, intervention, and personal management.  The patient has a  medical condition that impairs one or more vital organ systems.  Frequent personal assessment and adjustment of medical interventions was performed.      I have personally provided 70 minutes of critical care time exclusive of time spent on separately billable procedures. Time includes review of laboratory data, radiology results, discussion with consultants, patient and family; monitoring for potential decompensation, as well as time spent retrieving data and reviewing the chart and documenting the visit. Interventions were performed as documented above.     I have personally performed a face to face diagnostic evaluation on this patient. I have reviewed the ACP note and agree with the history, exam and plan of care, except as noted.

## 2023-12-06 NOTE — ED ADULT NURSE REASSESSMENT NOTE - NS ED NURSE REASSESS COMMENT FT1
Patient received from TUAN Johnson. She is A&Ox4, hard of hearing. She is in stable condition. Platelets completed with no issue. 3rd PRBC started and tolerating well.

## 2023-12-06 NOTE — PROVIDER CONTACT NOTE (OTHER) - RECOMMENDATIONS
per provider, bedrest for patient, continue to monitor for signs and symptoms of bleeding, check cbcq4,

## 2023-12-06 NOTE — H&P ADULT - NSHPLABSRESULTS_GEN_ALL_CORE
LABS:                        7.0    6.50  )-----------( 207      ( 06 Dec 2023 02:00 )             21.8     06 Dec 2023 02:00    137    |  105    |  23     ----------------------------<  190    4.6     |  20     |  0.77     Ca    7.4        06 Dec 2023 02:00    TPro  5.2    /  Alb  2.7    /  TBili  0.2    /  DBili  x      /  AST  22     /  ALT  8      /  AlkPhos  56     06 Dec 2023 02:00    PT/INR - ( 06 Dec 2023 02:00 )   PT: 13.1 sec;   INR: 1.18 ratio         PTT - ( 06 Dec 2023 02:00 )  PTT:23.2 sec    I reviewed the labs and imaging. I independently reviewed the EKG which showed no RACHELLE.

## 2023-12-06 NOTE — H&P ADULT - ASSESSMENT
Drea Moore is a 78W with PMH significant for but not limited to HTN/HLD, diverticulitis, anemia, ?thrombocytosis on ASA 81mg who is presenting as a transfer from [facility] for BRBPR found to have active extravasation on CTA abdomen into cecum. Drea Moore is a 78W with PMH significant for but not limited to HTN/HLD, diverticulitis, anemia, ?thrombocytosis on ASA 81mg who is presenting as a transfer from [facility] for BRBPR found to have active extravasation on CTA abdomen into cecum.

## 2023-12-06 NOTE — PROVIDER CONTACT NOTE (OTHER) - ASSESSMENT
s/p 3 units of blood, pt asked to use bathroom. PCA katja'ed pt to bathroom, upon returning pt felt heavy headed and lowered her head on the katja, RN and PCA returned pt to room. pt AAO4, VSS, fingerstick within limits, provider made aware

## 2023-12-06 NOTE — CONSULT NOTE ADULT - ASSESSMENT
Interventional Radiology    Evaluate for Procedure: mesenteric angio/embo    HPI: 77 yo female with hx of HTN/HLD, diverticulitis, anemia (receives iron transfusions), on ASA 81mg, presenting as a transfer from [facility] for BRBPR. IR consulted for evaluation of GI bleed.      Allergies: penicillin (Unknown)    Medications (Abx/Cardiac/Anticoagulation/Blood Products)      Data:  167.6, 167.6  98  T(C): 36.4  HR: 87  BP: 135/65  RR: 18  SpO2: 100%    -WBC 7.62 / HgB 8.2 / Hct 26.0 / Plt 232  -Na 137 / Cl 105 / BUN 24 / Glucose 197  -K 3.8 / CO2 26 / Cr 0.97  -ALT 14 / Alk Phos 77 / T.Bili 0.3  -INR 1.01 / PTT 27.1      Radiology: CTA Reviewed ; active extravasation in the terminal ileum    Assessment/Plan:   77 yo female with hx of HTN/HLD, diverticulitis, anemia (receives iron transfusions), on ASA 81mg, presenting as a transfer from [facility] for BRBPR. IR consulted for evaluation of GI bleed.   -- imaging & chart reviewed  -- active extravasation in terminal ileum ; patient hemodynamically stable  -- as per ED, plan for transfusion of 2U PRBC  -- recommend transfusing platelets as well given patient on ASA  -- recommend evaluation by GI   -- will re-evaluate in morning as patient is stable  -- if patient decompensates overnight requiring emergent intervention, IR is available    d/w Dr. Mares    --  Taz Valenzuela DO/ROSANA, PGY-5  Vascular and Interventional Radiology   Available on Microsoft Teams    - Non-emergent consults: Place IR consult order in [place]  - Emergent issues (pager): [facility] [phone]; [facility] [phone]; [phone]  - Scheduling questions: [facility] [phone]; [facility] [phone]  - Clinic/outpatient booking: [facility] [phone]; [facility] [phone] Interventional Radiology    Evaluate for Procedure: mesenteric angio/embo    HPI: 77 yo female with hx of HTN/HLD, diverticulitis, anemia (receives iron transfusions), on ASA 81mg, presenting as a transfer from [facility] for [facility]. IR consulted for evaluation of GI bleed.      Allergies: penicillin (Unknown)    Medications (Abx/Cardiac/Anticoagulation/Blood Products)      Data:  167.6, 167.6  98  T(C): 36.4  HR: 87  BP: 135/65  RR: 18  SpO2: 100%    -WBC 7.62 / HgB 8.2 / Hct 26.0 / Plt 232  -Na 137 / Cl 105 / BUN 24 / Glucose 197  -K 3.8 / CO2 26 / Cr 0.97  -ALT 14 / Alk Phos 77 / T.Bili 0.3  -INR 1.01 / PTT 27.1      Radiology: CTA Reviewed ; active extravasation in the terminal ileum    Assessment/Plan:   77 yo female with hx of HTN/HLD, diverticulitis, anemia (receives iron transfusions), on ASA 81mg, presenting as a transfer from [facility] for [facility]. IR consulted for evaluation of GI bleed.   -- imaging & chart reviewed  -- active extravasation in terminal ileum ; patient hemodynamically stable  -- as per ED, plan for transfusion of 2U PRBC  -- recommend transfusing platelets as well given patient on ASA  -- recommend evaluation by GI   -- will re-evaluate in morning as patient is stable  -- if patient decompensates overnight requiring emergent intervention, IR is available    d/w Dr. Mares    --  Taz Valenzuela DO/ROSANA, PGY-5  Vascular and Interventional Radiology   Available on Microsoft Teams    - Non-emergent consults: Place IR consult order in [place]  - Emergent issues (pager): [facility] [phone]; [facility] [phone]; [phone]  - Scheduling questions: [facility] [phone]; [facility] [phone]  - Clinic/outpatient booking: [facility] [phone]; [facility] [phone] Interventional Radiology    Evaluate for Procedure: mesenteric angio/embo    HPI: 77 yo female with hx of HTN/HLD, diverticulitis, anemia (receives iron transfusions), on ASA 81mg, presenting as a transfer from [facility] for [facility]. IR consulted for evaluation of GI bleed.      Allergies: penicillin (Unknown)    Medications (Abx/Cardiac/Anticoagulation/Blood Products)      Data:  167.6, 167.6  98  T(C): 36.4  HR: 87  BP: 135/65  RR: 18  SpO2: 100%    -WBC 7.62 / HgB 8.2 / Hct 26.0 / Plt 232  -Na 137 / Cl 105 / BUN 24 / Glucose 197  -K 3.8 / CO2 26 / Cr 0.97  -ALT 14 / Alk Phos 77 / T.Bili 0.3  -INR 1.01 / PTT 27.1      Radiology: CTA Reviewed ; active extravasation in the terminal ileum    Assessment/Plan:   77 yo female with hx of HTN/HLD, diverticulitis, anemia (receives iron transfusions), on ASA 81mg, presenting as a transfer from [facility] for [facility]. IR consulted for evaluation of GI bleed.   -- imaging & chart reviewed  -- active extravasation in terminal ileum ; patient hemodynamically stable  -- as per ED, plan for transfusion of 2U PRBC  -- recommend transfusing platelets as well given patient on ASA  -- recommend evaluation by GI   -- will re-evaluate in morning as patient is stable - patient will obtain follow-up CBC  -- if patient decompensates overnight requiring emergent intervention with continued BRBPR, IR is available    d/w Dr. Mares    --  Taz Valenzuela DO/ROSANA, PGY-5  Vascular and Interventional Radiology   Available on Microsoft Teams    - Non-emergent consults: Place IR consult order in [place]  - Emergent issues (pager): [facility] [phone]; [facility] [phone]; [id number]  - Scheduling questions: [facility] [phone]; [facility] [phone]  - Clinic/outpatient booking: [facility] [phone]; [facility] [phone] Interventional Radiology    Evaluate for Procedure: mesenteric angio/embo    HPI: 77 yo female with hx of HTN/HLD, diverticulitis, anemia (receives iron transfusions), on ASA 81mg, presenting as a transfer from [facility] for BRBPR. IR consulted for evaluation of GI bleed.      Allergies: penicillin (Unknown)    Medications (Abx/Cardiac/Anticoagulation/Blood Products)      Data:  167.6, 167.6  98  T(C): 36.4  HR: 87  BP: 135/65  RR: 18  SpO2: 100%    -WBC 7.62 / HgB 8.2 / Hct 26.0 / Plt 232  -Na 137 / Cl 105 / BUN 24 / Glucose 197  -K 3.8 / CO2 26 / Cr 0.97  -ALT 14 / Alk Phos 77 / T.Bili 0.3  -INR 1.01 / PTT 27.1      Radiology: CTA Reviewed ; active extravasation in the terminal ileum    Assessment/Plan:   77 yo female with hx of HTN/HLD, diverticulitis, anemia (receives iron transfusions), on ASA 81mg, presenting as a transfer from [facility] for [facility]BPR. IR consulted for evaluation of GI bleed.   -- imaging & chart reviewed  -- active extravasation in terminal ileum ; patient hemodynamically stable  -- as per ED, plan for transfusion of 2U PRBC  -- recommend transfusing platelets as well given patient on ASA  -- recommend evaluation by GI   -- will re-evaluate in morning as patient is stable - patient will obtain follow-up CBC  -- if patient decompensates overnight requiring emergent intervention with continued BRBPR, IR is available    d/w Dr. Mares    --  Taz Valenzuela DO/ROSANA, PGY-5  Vascular and Interventional Radiology   Available on [employer] Teams    - Non-emergent consults: Place IR consult order in [place]  - Emergent issues (pager): [facility] [phone]; [facility] [phone]; [phone]  - Scheduling questions: [facility] [phone]; [facility] [phone]  - Clinic/outpatient booking: [facility] [phone]; [facility] [phone]

## 2023-12-06 NOTE — H&P ADULT - NSICDXPASTMEDICALHX_GEN_ALL_CORE_FT
PAST MEDICAL HISTORY:  Chronic constipation     Hypertension     Thrombocythemia     Type 2 diabetes mellitus

## 2023-12-06 NOTE — H&P ADULT - HISTORY OF PRESENT ILLNESS
Drea Moore is a 78W with PMH significant for but not limited to HTN/HLD, diverticulitis, anemia, thrombocythemia (on ASA 81mg and anagrelide) presenting as a transfer from [facility] for BRBPR found to have active extravasation on CTA abdomen. Patient states that she was in her usual state of health yesterday, she suffers from intermittent constipation and yesterday had a notably hard stool. After defecating she noticed that there was bright red blood in the toilet and immediately went to [facility] for further investigation.  There she had CTA abdomen which showed chronic diverticulosis and GI bleed within the terminal ileum/cecum. Patient denies any fevers, chills, nausea, vomiting, abdominal pain. Last BM was early this morning with bright red blood/ clots.     In the ED patient was noticed to have Hgb of 10.2 which dropped to 7.0, patient was transfused with 2U PRBC and 1U platelets. IR and GI were consulted.  Drea Moore is a 78W with PMH significant for but not limited to HTN/HLD, diverticulitis, anemia, thrombocythemia (on ASA 81mg and anagrelide) presenting as a transfer from [facility] for BRBPR found to have active extravasation on CTA abdomen. Patient states that she was in her usual state of health yesterday, she suffers from intermittent constipation and yesterday had a notably hard stool. After defecating she noticed that there was bright red blood in the toilet and immediately went to [facility] for further investigation.  There she had CTA abdomen which showed chronic diverticulosis and GI bleed within the terminal ileum/cecum. Patient denies any fevers, chills, nausea, vomiting, abdominal pain. Last BM was early this morning with bright red blood/ clots.     In the ED patient was noticed to have Hgb of 10.2 which dropped to 7.0, patient was transfused with 2U PRBC and 1U platelets. IR and GI were consulted.

## 2023-12-06 NOTE — CONSULT NOTE ADULT - ATTENDING COMMENTS
# Hematochezia: Presented to OSH with hematochezia and found to have CTA positive for active bleeding in cecum vs TI. Transferred to [facility] for further management. Patient reports no prior history of GIB. On ASA and Anagrelide, but no other antithrombotic agents. Reports recent colonoscopy in 1/2023 with diverticulosis, but report not available to review. Suspect most likely bleeding secondary to diverticulosis, but given ?bleeding at level of TI and no prior bleeding in past, will pursue endoscopic evaluation to rule out alternate etiology (e.g. distal SB or cecal ectasia, etc)  # Thrombocytopenia on ASA 81mg and anagrelide    --Clear liquid diet, NPO at MN  --Tentatively plan for colonoscopy tomorrow. R/B/A of procedure discussed. Risks including, but not limited to, anesthesia, infection, bleeding perforation, missed lesions were reviewed. Additionally, it was discussed that bleeding source (e.g. culprit diverticulum) may not be identified and endoscopic evaluation will not prevent recurrence of diverticular bleeding in the future. Patient amenable to procedure.   --Please obtain recent records from colonoscopy earlier this year  --If ongoing bleeding and/or HD instability, please reconsult IR for possible embolization  --ASA and Anagrelide currently on hold per primary team    Additional recommendations as above. # Hematochezia: Presented to OSH with hematochezia and found to have CTA positive for active bleeding in cecum vs TI. Transferred to [facility] for further management. Patient reports no prior history of GIB. On ASA and Anagrelide, but no other antithrombotic agents. Reports recent colonoscopy in 1/2023 with diverticulosis, but report not available to review. Suspect most likely bleeding secondary to diverticulosis, but given ?bleeding at level of TI and no prior bleeding in past, will pursue endoscopic evaluation to rule out alternate etiology (e.g. distal SB or cecal ectasia, etc)  # Thrombocytopenia on ASA 81mg and anagrelide    --Clear liquid diet, NPO at MN  --Tentatively plan for colonoscopy tomorrow. R/B/A of procedure discussed. Risks including, but not limited to, anesthesia, infection, bleeding perforation, missed lesions were reviewed. Additionally, it was discussed that bleeding source (e.g. culprit diverticulum) may not be identified and endoscopic evaluation will not prevent recurrence of diverticular bleeding in the future. Patient amenable to procedure.   --Please obtain recent records from colonoscopy earlier this year  --If ongoing bleeding and/or HD instability, please reconsult IR for possible embolization  --ASA and Anagrelide currently on hold per primary team    Additional recommendations as above.

## 2023-12-06 NOTE — ED PROVIDER NOTE - OBJECTIVE STATEMENT
78F PMH Hypertension, hyperlipidemia, diverticulitis, anemia (receives iron transfusions), on aspirin 81 mg daily, anagrelide 1mg (recently increased to BID from once a day)  presenting to the emergency department with 2 hours of bright red blood per rectum, patient with active bleeding episode upon initial ED evaluation.  Patient denies abdominal pain, nausea, vomiting.  No fever, chills.  Patient reports having colonoscopy in the past which she was told was normal.  Denies hematuria, hematemesis.  Patient without lightheadedness, palpitations, chest pain. Pt presents as a transfer from [facility]. Repeat labs ordered. 78F PMH Hypertension, hyperlipidemia, diverticulitis, anemia (receives iron transfusions), on aspirin 81 mg daily, anagrelide 1mg (recently increased to BID from once a day)  presenting to the emergency department with 2 hours of bright red blood per rectum, patient with active bleeding episode upon initial ED evaluation.  Patient denies abdominal pain, nausea, vomiting.  No fever, chills.  Patient reports having colonoscopy in the past which she was told was normal.  Denies hematuria, hematemesis.  Patient without lightheadedness, palpitations, chest pain. Pt presents as a transfer from [facility]. Repeat labs ordered. 78F PMH Hypertension, hyperlipidemia, diverticulitis, anemia (receives iron transfusions), on aspirin 81 mg daily, anagrelide 1mg (recently increased to BID from once a day)  presenting to the emergency department with 2 hours of bright red blood per rectum, patient with active bleeding episode upon initial ED evaluation.  Patient denies abdominal pain, nausea, vomiting.  No fever, chills.  Patient reports having colonoscopy in the past which she was told was normal.  Denies hematuria, hematemesis.  Patient without lightheadedness, palpitations, chest pain. Pt presents as a transfer from [facility]. Repeat labs ordered, pt is now amenable to blood transfusion (previously declined). 78F PMH Hypertension, hyperlipidemia, diverticulitis, anemia (receives iron transfusions), on aspirin 81 mg daily, anagrelide 1mg (recently increased to BID from once a day)  presenting to the emergency department with 2 hours of bright red blood per rectum, patient with active bleeding episode upon initial ED evaluation.  Patient denies abdominal pain, nausea, vomiting.  No fever, chills.  Patient reports having colonoscopy in the past which she was told was normal.  Denies hematuria, hematemesis.  Patient without lightheadedness, palpitations, chest pain. Pt presents as a transfer from [facility]. Repeat labs ordered, pt is now amenable to blood transfusion (previously declined).

## 2023-12-06 NOTE — ED ADULT NURSE NOTE - OBJECTIVE STATEMENT
Facilitating RN: pt is AAOX4, ambulatory at baseline. pt transferred from [place]. pt presents to ED with 2x episodes of bright red blood per rectum. pt PMHx of DM2, HTN, Hyperthyroidism, diverticulitis and anemia. pt takes 81 mg of aspirin and 1mg of Anagerlide. pt presents to ED with active GI bleed. pt denies abdominal pain, N/V/D, chest pain, SOB, dizziness and headaches. pt RR are even and unlabored. Abdomen is soft and nontender. pt NSR on cardiac monitor. pt arrives w/ BL 20G IVs. labs drawn and sent. Family at bedside. Ongoing eval in progress. Facilitating RN: pt is AAOX4, ambulatory at baseline. pt transferred from [place]. pt presents to ED with 2x episodes of bright red blood per rectum. pt PMHx of DM2, HTN, Hyperthyroidism, diverticulitis and anemia. pt takes 81 mg of aspirin and 1mg of Anagerlide. pt presents to ED with active GI bleed. pt denies abdominal pain, N/V/D, chest pain, SOB, dizziness and headaches. pt RR are even and unlabored. Abdomen is soft and nontender. pt NSR on cardiac monitor. pt arrives w/ BL 20G IVs. labs drawn and sent. Family at bedside. Ongoing eval in progress.

## 2023-12-07 LAB
A1C WITH ESTIMATED AVERAGE GLUCOSE RESULT: 6.5 % — HIGH (ref 4–5.6)
A1C WITH ESTIMATED AVERAGE GLUCOSE RESULT: 6.5 % — HIGH (ref 4–5.6)
ANION GAP SERPL CALC-SCNC: 13 MMOL/L — SIGNIFICANT CHANGE UP (ref 7–14)
ANION GAP SERPL CALC-SCNC: 13 MMOL/L — SIGNIFICANT CHANGE UP (ref 7–14)
BUN SERPL-MCNC: 19 MG/DL — SIGNIFICANT CHANGE UP (ref 7–23)
BUN SERPL-MCNC: 19 MG/DL — SIGNIFICANT CHANGE UP (ref 7–23)
CALCIUM SERPL-MCNC: 8.2 MG/DL — LOW (ref 8.4–10.5)
CALCIUM SERPL-MCNC: 8.2 MG/DL — LOW (ref 8.4–10.5)
CHLORIDE SERPL-SCNC: 106 MMOL/L — SIGNIFICANT CHANGE UP (ref 98–107)
CHLORIDE SERPL-SCNC: 106 MMOL/L — SIGNIFICANT CHANGE UP (ref 98–107)
CO2 SERPL-SCNC: 23 MMOL/L — SIGNIFICANT CHANGE UP (ref 22–31)
CO2 SERPL-SCNC: 23 MMOL/L — SIGNIFICANT CHANGE UP (ref 22–31)
CREAT SERPL-MCNC: 0.8 MG/DL — SIGNIFICANT CHANGE UP (ref 0.5–1.3)
CREAT SERPL-MCNC: 0.8 MG/DL — SIGNIFICANT CHANGE UP (ref 0.5–1.3)
EGFR: 75 ML/MIN/1.73M2 — SIGNIFICANT CHANGE UP
EGFR: 75 ML/MIN/1.73M2 — SIGNIFICANT CHANGE UP
ESTIMATED AVERAGE GLUCOSE: 140 — SIGNIFICANT CHANGE UP
ESTIMATED AVERAGE GLUCOSE: 140 — SIGNIFICANT CHANGE UP
GLUCOSE BLDC GLUCOMTR-MCNC: 105 MG/DL — HIGH (ref 70–99)
GLUCOSE BLDC GLUCOMTR-MCNC: 105 MG/DL — HIGH (ref 70–99)
GLUCOSE BLDC GLUCOMTR-MCNC: 108 MG/DL — HIGH (ref 70–99)
GLUCOSE BLDC GLUCOMTR-MCNC: 108 MG/DL — HIGH (ref 70–99)
GLUCOSE BLDC GLUCOMTR-MCNC: 123 MG/DL — HIGH (ref 70–99)
GLUCOSE BLDC GLUCOMTR-MCNC: 123 MG/DL — HIGH (ref 70–99)
GLUCOSE BLDC GLUCOMTR-MCNC: 133 MG/DL — HIGH (ref 70–99)
GLUCOSE BLDC GLUCOMTR-MCNC: 133 MG/DL — HIGH (ref 70–99)
GLUCOSE BLDC GLUCOMTR-MCNC: 144 MG/DL — HIGH (ref 70–99)
GLUCOSE BLDC GLUCOMTR-MCNC: 144 MG/DL — HIGH (ref 70–99)
GLUCOSE SERPL-MCNC: 111 MG/DL — HIGH (ref 70–99)
GLUCOSE SERPL-MCNC: 111 MG/DL — HIGH (ref 70–99)
HCT VFR BLD CALC: 26.7 % — LOW (ref 34.5–45)
HCT VFR BLD CALC: 26.7 % — LOW (ref 34.5–45)
HGB BLD-MCNC: 8.9 G/DL — LOW (ref 11.5–15.5)
HGB BLD-MCNC: 8.9 G/DL — LOW (ref 11.5–15.5)
MAGNESIUM SERPL-MCNC: 2.3 MG/DL — SIGNIFICANT CHANGE UP (ref 1.6–2.6)
MAGNESIUM SERPL-MCNC: 2.3 MG/DL — SIGNIFICANT CHANGE UP (ref 1.6–2.6)
MCHC RBC-ENTMCNC: 25.9 PG — LOW (ref 27–34)
MCHC RBC-ENTMCNC: 25.9 PG — LOW (ref 27–34)
MCHC RBC-ENTMCNC: 33.3 GM/DL — SIGNIFICANT CHANGE UP (ref 32–36)
MCHC RBC-ENTMCNC: 33.3 GM/DL — SIGNIFICANT CHANGE UP (ref 32–36)
MCV RBC AUTO: 77.6 FL — LOW (ref 80–100)
MCV RBC AUTO: 77.6 FL — LOW (ref 80–100)
NRBC # BLD: 0 /100 WBCS — SIGNIFICANT CHANGE UP (ref 0–0)
NRBC # BLD: 0 /100 WBCS — SIGNIFICANT CHANGE UP (ref 0–0)
NRBC # FLD: 0.02 K/UL — HIGH (ref 0–0)
NRBC # FLD: 0.02 K/UL — HIGH (ref 0–0)
PHOSPHATE SERPL-MCNC: 2.8 MG/DL — SIGNIFICANT CHANGE UP (ref 2.5–4.5)
PHOSPHATE SERPL-MCNC: 2.8 MG/DL — SIGNIFICANT CHANGE UP (ref 2.5–4.5)
PLATELET # BLD AUTO: 209 K/UL — SIGNIFICANT CHANGE UP (ref 150–400)
PLATELET # BLD AUTO: 209 K/UL — SIGNIFICANT CHANGE UP (ref 150–400)
POTASSIUM SERPL-MCNC: 4.4 MMOL/L — SIGNIFICANT CHANGE UP (ref 3.5–5.3)
POTASSIUM SERPL-MCNC: 4.4 MMOL/L — SIGNIFICANT CHANGE UP (ref 3.5–5.3)
POTASSIUM SERPL-SCNC: 4.4 MMOL/L — SIGNIFICANT CHANGE UP (ref 3.5–5.3)
POTASSIUM SERPL-SCNC: 4.4 MMOL/L — SIGNIFICANT CHANGE UP (ref 3.5–5.3)
RBC # BLD: 3.44 M/UL — LOW (ref 3.8–5.2)
RBC # BLD: 3.44 M/UL — LOW (ref 3.8–5.2)
RBC # FLD: 22.7 % — HIGH (ref 10.3–14.5)
RBC # FLD: 22.7 % — HIGH (ref 10.3–14.5)
SODIUM SERPL-SCNC: 142 MMOL/L — SIGNIFICANT CHANGE UP (ref 135–145)
SODIUM SERPL-SCNC: 142 MMOL/L — SIGNIFICANT CHANGE UP (ref 135–145)
WBC # BLD: 7.29 K/UL — SIGNIFICANT CHANGE UP (ref 3.8–10.5)
WBC # BLD: 7.29 K/UL — SIGNIFICANT CHANGE UP (ref 3.8–10.5)
WBC # FLD AUTO: 7.29 K/UL — SIGNIFICANT CHANGE UP (ref 3.8–10.5)
WBC # FLD AUTO: 7.29 K/UL — SIGNIFICANT CHANGE UP (ref 3.8–10.5)

## 2023-12-07 PROCEDURE — 45378 DIAGNOSTIC COLONOSCOPY: CPT | Mod: GC

## 2023-12-07 PROCEDURE — 99232 SBSQ HOSP IP/OBS MODERATE 35: CPT

## 2023-12-07 RX ORDER — ANAGRELIDE HCL 0.5 MG
1 CAPSULE ORAL DAILY
Refills: 0 | Status: DISCONTINUED | OUTPATIENT
Start: 2023-12-07 | End: 2023-12-08

## 2023-12-07 RX ORDER — ASPIRIN/CALCIUM CARB/MAGNESIUM 324 MG
81 TABLET ORAL DAILY
Refills: 0 | Status: DISCONTINUED | OUTPATIENT
Start: 2023-12-07 | End: 2023-12-08

## 2023-12-07 RX ORDER — LOSARTAN POTASSIUM 100 MG/1
100 TABLET, FILM COATED ORAL DAILY
Refills: 0 | Status: DISCONTINUED | OUTPATIENT
Start: 2023-12-07 | End: 2023-12-08

## 2023-12-07 RX ORDER — METOPROLOL TARTRATE 50 MG
100 TABLET ORAL DAILY
Refills: 0 | Status: DISCONTINUED | OUTPATIENT
Start: 2023-12-07 | End: 2023-12-08

## 2023-12-07 RX ORDER — AMLODIPINE BESYLATE 2.5 MG/1
5 TABLET ORAL DAILY
Refills: 0 | Status: DISCONTINUED | OUTPATIENT
Start: 2023-12-07 | End: 2023-12-08

## 2023-12-07 NOTE — PHYSICAL THERAPY INITIAL EVALUATION ADULT - PERTINENT HX OF CURRENT PROBLEM, REHAB EVAL
Drea Moore is a 78W with PMH significant for but not limited to HTN/HLD, diverticulitis, anemia, thrombocythemia (on ASA 81mg and anagrelide) presenting as a transfer from [facility] for BRBPR found to have active extravasation on CTA abdomen. Patient states that she was in her usual state of health yesterday, she suffers from intermittent constipation and yesterday had a notably hard stool. Drea Moore is a 78W with PMH significant for but not limited to HTN/HLD, diverticulitis, anemia, thrombocythemia (on ASA 81mg and anagrelide) presenting as a transfer from [facility] for BRBPR found to have active extravasation on CTA abdomen. Patient states that she was in her usual state of health yesterday, she suffers from intermittent constipation and yesterday had a notably hard stool.

## 2023-12-07 NOTE — PHYSICAL THERAPY INITIAL EVALUATION ADULT - ADDITIONAL COMMENTS
Pt reports she lives in a home few steps to negotiate, has fallen in the past, does not own RW, completes ADLs without assistance.  Patients Current SpO2: 99%

## 2023-12-07 NOTE — PHYSICAL THERAPY INITIAL EVALUATION ADULT - NSPTDISCHREC_GEN_A_CORE
Continue with Outpatient PT.  Pt would benefit from a rolling walker in order to improve mobility related ADL limitations associated with balance impairments.

## 2023-12-07 NOTE — CHART NOTE - NSCHARTNOTEFT_GEN_A_CORE
79 yo female with hx of HTN/HLD, diverticulitis, anemia (receives iron transfusions), on ASA 81mg, presenting as a transfer from [facility] for BRBPR. CTA with active extravasation at terminal lilium. IR consulted for evaluation of GI bleed.     Pt had colonoscopy today with no active bleeding and source likely from diverticular bleed which is now resolved.     - IR will sign off. If recurrent bleeding, please reconsult as needed.     --  Homer Weston MD, PGY-3  Vascular and Interventional Radiology   Available on [employer] Teams    - Non-emergent consults: Place IR consult order in [place]  - Emergent issues (pager): [facility] [phone]; [facility] [phone]; [phone]  - Scheduling questions: [facility] [phone]; [facility] [phone]  - Clinic/outpatient booking: [facility] [phone]; [facility] [phone] 79 yo female with hx of HTN/HLD, diverticulitis, anemia (receives iron transfusions), on ASA 81mg, presenting as a transfer from [facility] for BRBPR. CTA with active extravasation at terminal lilium. IR consulted for evaluation of GI bleed.     Pt had colonoscopy today with no active bleeding and source likely from diverticular bleed which is now resolved.     - IR will sign off. If recurrent bleeding, please reconsult as needed.     --  Homer Weston MD, PGY-3  Vascular and Interventional Radiology   Available on [employer] Teams    - Non-emergent consults: Place IR consult order in [place]  - Emergent issues (pager): [facility] [phone]; [facility] [phone]; [id number]  - Scheduling questions: [facility] [phone]; [facility] [phone]  - Clinic/outpatient booking: [facility] [phone]; [facility] [phone]

## 2023-12-07 NOTE — PHYSICAL THERAPY INITIAL EVALUATION ADULT - GAIT DISTANCE, PT EVAL
40 ft.  Pt unsteady on feet.  Losing balance frequently.  Pt reports ~2-3 falls this year.   Will continue to monitor patients functional abilities.

## 2023-12-07 NOTE — PROGRESS NOTE ADULT - PROBLEM SELECTOR PLAN 1
Presenting with active GI hemorrhage with CTA showing active contrast extravasation terminal ileum/cecum.  - Hgb rapidly dropping 10.4->7.0 consistent with active bleeding   - s/p transfusion  2U PRBC, 1U platelets.   - consider IVF after transfusion   - maintain two large bore IVs   - GI/IR consulted, f/u recs , s/p colonoscopy - No signs of active bleeding. Source of hematochezia                        likely from proximal diverticular bleed, which has                        resolved.  Resume  ASA, anagrelide  - resume diet   - monitor CBCs qdaily unless bleeding

## 2023-12-07 NOTE — PROGRESS NOTE ADULT - ASSESSMENT
Drea Moore is a 78W with PMH significant for but not limited to HTN/HLD, diverticulitis, anemia, ?thrombocytosis on ASA 81mg /Anagrelide who is presenting as a transfer from [facility] for BRBPR found to have active extravasation on CTA abdomen into cecum, now is s/p colonoscopy .   Drea Moore is a 78W with PMH significant for but not limited to HTN/HLD, diverticulitis, anemia, ?thrombocytosis on ASA 81mg /Anagrelide who is presenting as a transfer from [facility] for BRBPR found to have active extravasation on CTA abdomen into cecum, now is s/p colonoscopy .

## 2023-12-07 NOTE — PHYSICAL THERAPY INITIAL EVALUATION ADULT - STANDING BALANCE: STATIC
fair balance seen earlier in day    full consult to follow    68M active smoker PMH COPD not on home O2, never intubated, B/L hip replacement, blind L eye, Deaf L ear, hiatal hernia, GERD presents for SOBx4 days. Found to be hypoxic. Admitted for COPD exacerbation, acute hypoxic respiratory failure    - active smoker  - no fever, no chills, no sweats, no weight loss  - no known exposure with someone with TB, no sick contacts  - only recently for 3 days stayed at shelter  - reports he has had 3 prior episodes of PNA and multiple COPD exacerbations for which he would get antibx and inhalers with steroids  - CT chest reviewed: small cystic cavities without surrounding opacities. noted is a small nodule adjacent to cavity. cavities do not appear by my eyes to be fungal  - d/w chest radiologist   - suspect more of prior infections with CT chest findings a sequale or prior infections  - without constitutional symptoms and only recent short (few days) of staying at shelter TB lower on differential  - pt this evening had episode of respiratory distress, tachypnea, wheeze, o2 desat to the 80s on O2 supplementation with tachycardia HR 130s after eating  - states for past 1 year he frequently gets "food stuck" or regurgitated with emesis while attempting to eat: ?possibly related to pt's hiatal hernia  - ?aspiration with hypoxia  - stat duonebs given with relief  - cont duonebs ATC, on solumderol but if able to tolerate PO would change to prednisone 40mg daily  - on antibx which was broadened from rocephin to zosyn by primary team today  - will follow     seen earlier in day    68M active smoker PMH COPD not on home O2, never intubated, asthma, B/L hip replacement, blind L eye, Deaf L ear, hiatal hernia, GERD, dysphagia presents for SOBx4 days. Found to be hypoxic. Admitted for COPD exacerbation, acute hypoxic respiratory failure    - active smoker (needs smoking cessation)  - no fever, no chills, no sweats, no weight loss  - no known exposure with someone with TB, no sick contacts  - only recently for 3 days stayed at shelter  - reports he has had 3 prior episodes of PNA and multiple COPD exacerbations for which he would get antibx and inhalers with steroids  - CT chest reviewed: small cystic cavities without surrounding opacities. noted is a small nodule adjacent to cavity. cavities do not appear by my eyes to be fungal  - d/w chest radiologist   - suspect more of prior infections with CT chest findings a sequale or prior infections  - without constitutional symptoms and only recent short (few days) of staying at shelter TB lower on differential  - COVID and flu negative  - check full RVP panel  - check MRSA nasal swab  - check sputum cx  - pt this evening had episode of respiratory distress, tachypnea, wheeze, o2 desat to the 80s on O2 supplementation with tachycardia HR 130s after eating  - states for past 1 year he frequently gets "food stuck" or regurgitated with emesis while attempting to eat: ?possibly related to pt's hiatal hernia  - ?aspiration with hypoxia  - stat duonebs given with relief  - cont duonebs ATC, on solumderol but if able to tolerate PO would change to prednisone 40mg daily  - on antibx which was broadened from rocephin to zosyn by primary team today   - will follow

## 2023-12-07 NOTE — PROGRESS NOTE ADULT - PROBLEM SELECTOR PLAN 2
Home medications: tribenzor (olmasartan/amlodipine/HCTZ) 40/5/12.5, metoprolol  daily   -resume  home antihypertensives   - monitor BP,

## 2023-12-07 NOTE — PROGRESS NOTE ADULT - SUBJECTIVE AND OBJECTIVE BOX
Medicine Progress Note    Patient is a 78y old  Female who presents with a chief complaint of GIB (06 Dec 2023 11:09)      SUBJECTIVE / OVERNIGHT EVENTS: s/p colonoscopy today   no BM in the last 24 hours       MEDICATIONS  (STANDING):  amLODIPine   Tablet 5 milliGRAM(s) Oral daily  anagrelide 1 milliGRAM(s) Oral daily  aspirin enteric coated 81 milliGRAM(s) Oral daily  dextrose 5%. 1000 milliLiter(s) (50 mL/Hr) IV Continuous <Continuous>  dextrose 5%. 1000 milliLiter(s) (100 mL/Hr) IV Continuous <Continuous>  dextrose 50% Injectable 25 Gram(s) IV Push once  dextrose 50% Injectable 12.5 Gram(s) IV Push once  dextrose 50% Injectable 25 Gram(s) IV Push once  glucagon  Injectable 1 milliGRAM(s) IntraMuscular once  insulin lispro (ADMELOG) corrective regimen sliding scale   SubCutaneous at bedtime  insulin lispro (ADMELOG) corrective regimen sliding scale   SubCutaneous three times a day before meals  losartan 100 milliGRAM(s) Oral daily  metoprolol succinate  milliGRAM(s) Oral daily    MEDICATIONS  (PRN):  dextrose Oral Gel 15 Gram(s) Oral once PRN Blood Glucose LESS THAN 70 milliGRAM(s)/deciliter  melatonin 3 milliGRAM(s) Oral at bedtime PRN Insomnia    CAPILLARY BLOOD GLUCOSE      POCT Blood Glucose.: 105 mg/dL (07 Dec 2023 12:26)  POCT Blood Glucose.: 123 mg/dL (07 Dec 2023 11:05)  POCT Blood Glucose.: 108 mg/dL (07 Dec 2023 08:55)  POCT Blood Glucose.: 151 mg/dL (06 Dec 2023 22:01)  POCT Blood Glucose.: 172 mg/dL (06 Dec 2023 17:34)    I&O's Summary      PHYSICAL EXAM:  Vital Signs Last 24 Hrs  T(C): 36.8 (07 Dec 2023 12:38), Max: 36.9 (07 Dec 2023 04:15)  T(F): 98.2 (07 Dec 2023 12:38), Max: 98.4 (07 Dec 2023 04:15)  HR: 96 (07 Dec 2023 12:38) (82 - 107)  BP: 136/79 (07 Dec 2023 12:38) (104/55 - 165/65)  BP(mean): --  RR: 18 (07 Dec 2023 12:38) (17 - 22)  SpO2: 100% (07 Dec 2023 12:38) (95% - 100%)    Parameters below as of 07 Dec 2023 11:00  Patient On (Oxygen Delivery Method): room air      CONSTITUTIONAL: NAD,   ENMT: Moist oral mucosa, no pharyngeal injection or exudates;   RESPIRATORY: Normal respiratory effort; lungs are clear to auscultation bilaterally  CARDIOVASCULAR: Regular rate and rhythm, normal S1 and S2, ; trace  lower extremity edema;   ABDOMEN: Nontender to palpation, normoactive bowel sounds, no rebound/guarding;   PSYCH: A+O to person, place, and time; affect appropriate  NEUROLOGY: CN 2-12 are intact and symmetric; no gross sensory deficits   SKIN: No rashes; no palpable lesions    LABS:                        8.9    7.29  )-----------( 209      ( 07 Dec 2023 05:30 )             26.7     12-07    142  |  106  |  19  ----------------------------<  111<H>  4.4   |  23  |  0.80    Ca    8.2<L>      07 Dec 2023 05:30  Phos  2.8     12-07  Mg     2.30     12-07    TPro  5.2<L>  /  Alb  2.7<L>  /  TBili  0.2  /  DBili  x   /  AST  22  /  ALT  8   /  AlkPhos  56  12-06    PT/INR - ( 06 Dec 2023 02:00 )   PT: 13.1 sec;   INR: 1.18 ratio         PTT - ( 06 Dec 2023 02:00 )  PTT:23.2 sec      Urinalysis Basic - ( 07 Dec 2023 05:30 )    Color: x / Appearance: x / SG: x / pH: x  Gluc: 111 mg/dL / Ketone: x  / Bili: x / Urobili: x   Blood: x / Protein: x / Nitrite: x   Leuk Esterase: x / RBC: x / WBC x   Sq Epi: x / Non Sq Epi: x / Bacteria: x            RADIOLOGY & ADDITIONAL TESTS:  Imaging from Last 24 Hours:    Electrocardiogram/QTc Interval:    COORDINATION OF CARE:  Care Discussed with Consultants/Other Providers: ACP

## 2023-12-08 ENCOUNTER — TRANSCRIPTION ENCOUNTER (OUTPATIENT)
Age: 78
End: 2023-12-08

## 2023-12-08 VITALS
OXYGEN SATURATION: 99 % | HEART RATE: 71 BPM | DIASTOLIC BLOOD PRESSURE: 68 MMHG | RESPIRATION RATE: 18 BRPM | TEMPERATURE: 98 F | SYSTOLIC BLOOD PRESSURE: 144 MMHG

## 2023-12-08 LAB
ANION GAP SERPL CALC-SCNC: 9 MMOL/L — SIGNIFICANT CHANGE UP (ref 7–14)
ANION GAP SERPL CALC-SCNC: 9 MMOL/L — SIGNIFICANT CHANGE UP (ref 7–14)
BUN SERPL-MCNC: 10 MG/DL — SIGNIFICANT CHANGE UP (ref 7–23)
BUN SERPL-MCNC: 10 MG/DL — SIGNIFICANT CHANGE UP (ref 7–23)
CALCIUM SERPL-MCNC: 8.6 MG/DL — SIGNIFICANT CHANGE UP (ref 8.4–10.5)
CALCIUM SERPL-MCNC: 8.6 MG/DL — SIGNIFICANT CHANGE UP (ref 8.4–10.5)
CHLORIDE SERPL-SCNC: 106 MMOL/L — SIGNIFICANT CHANGE UP (ref 98–107)
CHLORIDE SERPL-SCNC: 106 MMOL/L — SIGNIFICANT CHANGE UP (ref 98–107)
CO2 SERPL-SCNC: 27 MMOL/L — SIGNIFICANT CHANGE UP (ref 22–31)
CO2 SERPL-SCNC: 27 MMOL/L — SIGNIFICANT CHANGE UP (ref 22–31)
CREAT SERPL-MCNC: 0.68 MG/DL — SIGNIFICANT CHANGE UP (ref 0.5–1.3)
CREAT SERPL-MCNC: 0.68 MG/DL — SIGNIFICANT CHANGE UP (ref 0.5–1.3)
EGFR: 89 ML/MIN/1.73M2 — SIGNIFICANT CHANGE UP
EGFR: 89 ML/MIN/1.73M2 — SIGNIFICANT CHANGE UP
GLUCOSE BLDC GLUCOMTR-MCNC: 117 MG/DL — HIGH (ref 70–99)
GLUCOSE BLDC GLUCOMTR-MCNC: 117 MG/DL — HIGH (ref 70–99)
GLUCOSE BLDC GLUCOMTR-MCNC: 138 MG/DL — HIGH (ref 70–99)
GLUCOSE BLDC GLUCOMTR-MCNC: 138 MG/DL — HIGH (ref 70–99)
GLUCOSE SERPL-MCNC: 124 MG/DL — HIGH (ref 70–99)
GLUCOSE SERPL-MCNC: 124 MG/DL — HIGH (ref 70–99)
HCT VFR BLD CALC: 27 % — LOW (ref 34.5–45)
HCT VFR BLD CALC: 27 % — LOW (ref 34.5–45)
HGB BLD-MCNC: 9 G/DL — LOW (ref 11.5–15.5)
HGB BLD-MCNC: 9 G/DL — LOW (ref 11.5–15.5)
MAGNESIUM SERPL-MCNC: 2.3 MG/DL — SIGNIFICANT CHANGE UP (ref 1.6–2.6)
MAGNESIUM SERPL-MCNC: 2.3 MG/DL — SIGNIFICANT CHANGE UP (ref 1.6–2.6)
MCHC RBC-ENTMCNC: 26.2 PG — LOW (ref 27–34)
MCHC RBC-ENTMCNC: 26.2 PG — LOW (ref 27–34)
MCHC RBC-ENTMCNC: 33.3 GM/DL — SIGNIFICANT CHANGE UP (ref 32–36)
MCHC RBC-ENTMCNC: 33.3 GM/DL — SIGNIFICANT CHANGE UP (ref 32–36)
MCV RBC AUTO: 78.7 FL — LOW (ref 80–100)
MCV RBC AUTO: 78.7 FL — LOW (ref 80–100)
NRBC # BLD: 0 /100 WBCS — SIGNIFICANT CHANGE UP (ref 0–0)
NRBC # BLD: 0 /100 WBCS — SIGNIFICANT CHANGE UP (ref 0–0)
NRBC # FLD: 0.02 K/UL — HIGH (ref 0–0)
NRBC # FLD: 0.02 K/UL — HIGH (ref 0–0)
PHOSPHATE SERPL-MCNC: 2.6 MG/DL — SIGNIFICANT CHANGE UP (ref 2.5–4.5)
PHOSPHATE SERPL-MCNC: 2.6 MG/DL — SIGNIFICANT CHANGE UP (ref 2.5–4.5)
PLATELET # BLD AUTO: 290 K/UL — SIGNIFICANT CHANGE UP (ref 150–400)
PLATELET # BLD AUTO: 290 K/UL — SIGNIFICANT CHANGE UP (ref 150–400)
POTASSIUM SERPL-MCNC: 3.8 MMOL/L — SIGNIFICANT CHANGE UP (ref 3.5–5.3)
POTASSIUM SERPL-MCNC: 3.8 MMOL/L — SIGNIFICANT CHANGE UP (ref 3.5–5.3)
POTASSIUM SERPL-SCNC: 3.8 MMOL/L — SIGNIFICANT CHANGE UP (ref 3.5–5.3)
POTASSIUM SERPL-SCNC: 3.8 MMOL/L — SIGNIFICANT CHANGE UP (ref 3.5–5.3)
RBC # BLD: 3.43 M/UL — LOW (ref 3.8–5.2)
RBC # BLD: 3.43 M/UL — LOW (ref 3.8–5.2)
RBC # FLD: 23 % — HIGH (ref 10.3–14.5)
RBC # FLD: 23 % — HIGH (ref 10.3–14.5)
SODIUM SERPL-SCNC: 142 MMOL/L — SIGNIFICANT CHANGE UP (ref 135–145)
SODIUM SERPL-SCNC: 142 MMOL/L — SIGNIFICANT CHANGE UP (ref 135–145)
WBC # BLD: 6.98 K/UL — SIGNIFICANT CHANGE UP (ref 3.8–10.5)
WBC # BLD: 6.98 K/UL — SIGNIFICANT CHANGE UP (ref 3.8–10.5)
WBC # FLD AUTO: 6.98 K/UL — SIGNIFICANT CHANGE UP (ref 3.8–10.5)
WBC # FLD AUTO: 6.98 K/UL — SIGNIFICANT CHANGE UP (ref 3.8–10.5)

## 2023-12-08 PROCEDURE — 99239 HOSP IP/OBS DSCHRG MGMT >30: CPT

## 2023-12-08 PROCEDURE — 99231 SBSQ HOSP IP/OBS SF/LOW 25: CPT | Mod: GC

## 2023-12-08 RX ADMIN — Medication 81 MILLIGRAM(S): at 13:08

## 2023-12-08 RX ADMIN — Medication 100 MILLIGRAM(S): at 06:45

## 2023-12-08 RX ADMIN — LOSARTAN POTASSIUM 100 MILLIGRAM(S): 100 TABLET, FILM COATED ORAL at 07:47

## 2023-12-08 RX ADMIN — Medication 1 MILLIGRAM(S): at 14:11

## 2023-12-08 RX ADMIN — AMLODIPINE BESYLATE 5 MILLIGRAM(S): 2.5 TABLET ORAL at 07:56

## 2023-12-08 NOTE — DISCHARGE NOTE PROVIDER - NSDCCPCAREPLAN_GEN_ALL_CORE_FT
PRINCIPAL DISCHARGE DIAGNOSIS  Diagnosis: GI bleed  Assessment and Plan of Treatment: You presented to the hospital with a GI bleed. You were given 3 units of packed red blood cells as well as one unit of platelets. You were seen by GI team during your admission and had a colonoscopy on 12/7 which showed no active bleeding. Your bleeding was likely from a diverticular bleed. Please follow up with your PCP in 1-2 weeks.      SECONDARY DISCHARGE DIAGNOSES  Diagnosis: Anemia due to acute blood loss  Assessment and Plan of Treatment: You presented to the hospital for acute blood loss due to a GI bleed. You recieved 3 units of Red blood cells and 1 unit of platelets. Please follow up with your PCP in 1-2 weeks as well as your hematologist.

## 2023-12-08 NOTE — PROGRESS NOTE ADULT - ASSESSMENT
78W with PMH significant for but not limited to HTN/HLD, diverticulosis, anemia, thrombocythemia (on ASA 81mg and anagrelide) presenting as a transfer from [facility] for [facility], found to have CT AP Active GI bleed within the terminal ileum/cecum.    #BRBPR  Most likely diverticular bleed.   - last OP colonoscopy in 01/2023 with diverticulosis   - colonoscopy 12/07 with diverticulosis, no active bleeding   - CTA with active GI bleed within the terminal ileum/cecum.    Recommendations:   - patient doing well   - colonoscopy with no active bleeding   - patient with likely diverticular bleeding that resolved   - If recurrent bleeding, suggest IR consultation for possible embolization.  - rest of care per primary team     No further recommendations from Gastroenterology at this point. Gastroenterology will sign off at this time. Please re-consult for any other further questions.   All recommendations are tentative until note is attested by attending.     Janie Lira, PGY-5  Gastroenterology/Hepatology Fellow  Available on Microsoft Teams  [phone] ([facility] Short Range Pager)  [phone] ([facility] Long Range Pager)    After 5pm, please contact the on-call GI fellow. [phone]     78W with PMH significant for but not limited to HTN/HLD, diverticulosis, anemia, thrombocythemia (on ASA 81mg and anagrelide) presenting as a transfer from [facility] for [facility], found to have CT AP Active GI bleed within the terminal ileum/cecum.    #[facility]  Most likely diverticular bleed.   - last OP colonoscopy in 01/2023 with diverticulosis   - colonoscopy 12/07 with diverticulosis, no active bleeding   - CTA with active GI bleed within the terminal ileum/cecum.    Recommendations:   - patient doing well   - colonoscopy with no active bleeding   - patient with likely diverticular bleeding that resolved   - If recurrent bleeding, suggest IR consultation for possible embolization.  - rest of care per primary team     No further recommendations from Gastroenterology at this point. Gastroenterology will sign off at this time. Please re-consult for any other further questions.   All recommendations are tentative until note is attested by attending.     Janie Lira, PGY-5  Gastroenterology/Hepatology Fellow  Available on Microsoft Teams  [phone] ([facility] Short Range Pager)  [phone] ([facility] Long Range Pager)    After 5pm, please contact the on-call GI fellow. [phone]     78W with PMH significant for but not limited to HTN/HLD, diverticulosis, anemia, thrombocythemia (on ASA 81mg and anagrelide) presenting as a transfer from [facility] for [facility], found to have CT AP Active GI bleed within the terminal ileum/cecum.    #[facility]  Most likely diverticular bleed.   - last OP colonoscopy in 01/2023 with diverticulosis   - colonoscopy 12/07 with diverticulosis, no active bleeding   - CTA with active GI bleed within the terminal ileum/cecum.    Recommendations:   - patient doing well   - diet as tolerated  - colonoscopy with no active bleeding   - patient with likely diverticular bleeding that resolved   - If recurrent bleeding, suggest IR consultation for possible embolization.  - rest of care per primary team     No further recommendations from Gastroenterology at this point. Gastroenterology will sign off at this time. Please re-consult for any other further questions.   All recommendations are tentative until note is attested by attending.     Janie Lira, PGY-5  Gastroenterology/Hepatology Fellow  Available on Microsoft Teams  [phone] ([facility] Short Range Pager)  [phone] ([facility] Long Range Pager)    After 5pm, please contact the on-call GI fellow. [phone]     78W with PMH significant for but not limited to HTN/HLD, diverticulosis, anemia, thrombocythemia (on ASA 81mg and anagrelide) presenting as a transfer from [facility] for [facility], found to have CT AP Active GI bleed within the terminal ileum/cecum.    #[facility]BPR  Most likely diverticular bleed.   - last OP colonoscopy in 01/2023 with diverticulosis   - colonoscopy 12/07 with diverticulosis, no active bleeding   - CTA with active GI bleed within the terminal ileum/cecum.    Recommendations:   - patient doing well   - diet as tolerated  - colonoscopy with no active bleeding   - patient with likely diverticular bleeding that resolved   - If recurrent bleeding, suggest IR consultation for possible embolization.  - rest of care per primary team     No further recommendations from Gastroenterology at this point. Gastroenterology will sign off at this time. Please re-consult for any other further questions.   All recommendations are tentative until note is attested by attending.     Janie Lira, PGY-5  Gastroenterology/Hepatology Fellow  Available on Microsoft Teams  [phone] ([facility] Short Range Pager)  [phone] ([facility] Long Range Pager)    After 5pm, please contact the on-call GI fellow. [phone]

## 2023-12-08 NOTE — DISCHARGE NOTE PROVIDER - ATTENDING DISCHARGE PHYSICAL EXAMINATION:
.  VITAL SIGNS:  T(C): 36.7 (12-08-23 @ 12:25), Max: 37.3 (12-07-23 @ 21:49)  T(F): 98.1 (12-08-23 @ 12:25), Max: 99.2 (12-07-23 @ 21:49)  HR: 71 (12-08-23 @ 12:25) (71 - 89)  BP: 144/68 (12-08-23 @ 12:25) (139/73 - 183/83)  BP(mean): --  RR: 18 (12-08-23 @ 12:25) (18 - 18)  SpO2: 99% (12-08-23 @ 12:25) (96% - 100%)  Wt(kg): --    PHYSICAL EXAM:    Constitutional: WDWN resting comfortably in bed; NAD  Head: NC/AT  Eyes: PERRL, EOMI, clear conjunctiva  ENT: no nasal discharge; uvula midline, no oropharyngeal erythema or exudates;   Neck: supple;  Respiratory: CTA B/L; no W/R/R, no retractions  Cardiac: +S1/S2; RRR;   Gastrointestinal: soft, NT/ND; no rebound or guarding; +BSx4  Genitourinary: normal external genitalia  Back: spine midline, no bony tenderness or step-offs; no CVAT B/L  Extremities: WWP, no clubbing or cyanosis; no peripheral edema  Musculoskeletal: NROM x4; no joint swelling, tenderness or erythema  Neurologic: AAOx3; CNII-XII grossly intact; no focal deficits  Psychiatric: affect and characteristics of appearance, verbalizations, behaviors are appropriate

## 2023-12-08 NOTE — DISCHARGE NOTE PROVIDER - NSDCFUSCHEDAPPT_GEN_ALL_CORE_FT
[facility]  [address]  Scheduled Appointment: 01/12/2024     [facility]  [address]  Scheduled Appointment: 01/12/2024     Prednisone Counseling:  I discussed with the patient the risks of prolonged use of prednisone including but not limited to weight gain, insomnia, osteoporosis, mood changes, diabetes, susceptibility to infection, glaucoma and high blood pressure.  In cases where prednisone use is prolonged, patients should be monitored with blood pressure checks, serum glucose levels and an eye exam.  Additionally, the patient may need to be placed on GI prophylaxis, PCP prophylaxis, and calcium and vitamin D supplementation and/or a bisphosphonate.  The patient verbalized understanding of the proper use and the possible adverse effects of prednisone.  All of the patient's questions and concerns were addressed.

## 2023-12-08 NOTE — PROGRESS NOTE ADULT - SUBJECTIVE AND OBJECTIVE BOX
ANESTHESIA POSTOP CHECK    78y Female POSTOP DAY 1 S/P colonoscopy    Vital Signs Last 24 Hrs  T(C): 36.7 (08 Dec 2023 12:25), Max: 37.3 (07 Dec 2023 21:49)  T(F): 98.1 (08 Dec 2023 12:25), Max: 99.2 (07 Dec 2023 21:49)  HR: 71 (08 Dec 2023 12:25) (71 - 89)  BP: 144/68 (08 Dec 2023 12:25) (139/73 - 183/83)  BP(mean): --  RR: 18 (08 Dec 2023 12:25) (18 - 18)  SpO2: 99% (08 Dec 2023 12:25) (96% - 100%)    Parameters below as of 08 Dec 2023 06:42  Patient On (Oxygen Delivery Method): room air      I&O's Summary      [X ] NO APPARENT ANESTHESIA COMPLICATIONS      Comments:

## 2023-12-08 NOTE — PROGRESS NOTE ADULT - SUBJECTIVE AND OBJECTIVE BOX
Gastroenterology/Hepatology Progress Note    Interval Events:   - patient reports feeling better   - no complaints at this time     Allergies:  penicillin (Unknown)      Hospital Medications:  amLODIPine   Tablet 5 milliGRAM(s) Oral daily  anagrelide 1 milliGRAM(s) Oral daily  aspirin enteric coated 81 milliGRAM(s) Oral daily  dextrose 5%. 1000 milliLiter(s) IV Continuous <Continuous>  dextrose 5%. 1000 milliLiter(s) IV Continuous <Continuous>  dextrose 50% Injectable 25 Gram(s) IV Push once  dextrose 50% Injectable 25 Gram(s) IV Push once  dextrose 50% Injectable 12.5 Gram(s) IV Push once  dextrose Oral Gel 15 Gram(s) Oral once PRN  glucagon  Injectable 1 milliGRAM(s) IntraMuscular once  insulin lispro (ADMELOG) corrective regimen sliding scale   SubCutaneous at bedtime  insulin lispro (ADMELOG) corrective regimen sliding scale   SubCutaneous three times a day before meals  losartan 100 milliGRAM(s) Oral daily  melatonin 3 milliGRAM(s) Oral at bedtime PRN  metoprolol succinate  milliGRAM(s) Oral daily      ROS: 14 point ROS negative unless otherwise state in subjective    PHYSICAL EXAM:   Vital Signs:  Vital Signs Last 24 Hrs  T(C): 36.5 (08 Dec 2023 06:42), Max: 37.3 (07 Dec 2023 21:49)  T(F): 97.7 (08 Dec 2023 06:42), Max: 99.2 (07 Dec 2023 21:49)  HR: 80 (08 Dec 2023 06:42) (80 - 107)  BP: 183/83 (08 Dec 2023 06:42) (104/55 - 183/83)  BP(mean): --  RR: 18 (08 Dec 2023 06:42) (18 - 22)  SpO2: 99% (08 Dec 2023 06:42) (96% - 100%)    Parameters below as of 08 Dec 2023 06:42  Patient On (Oxygen Delivery Method): room air      Daily     Daily     GENERAL:  No acute distress  HEENT:  NCAT, no scleral icterus  CHEST: no resp distress  HEART:  RRR  ABDOMEN:  Soft, non-tender, non-distended, no masses  EXTREMITIES:  No cyanosis, clubbing, or edema  SKIN:  No rash/erythema/ecchymoses/petechiae/wounds/abscess/warm/dry  NEURO:  Alert and oriented x 3    LABS:                        9.0    6.98  )-----------( 290      ( 08 Dec 2023 07:31 )             27.0     Mean Cell Volume: 78.7 fL (12-08-23 @ 07:31)    12-08    142  |  106  |  10  ----------------------------<  124<H>  3.8   |  27  |  0.68    Ca    8.6      08 Dec 2023 07:31  Phos  2.6     12-08  Mg     2.30     12-08          Urinalysis Basic - ( 08 Dec 2023 07:31 )    Color: x / Appearance: x / SG: x / pH: x  Gluc: 124 mg/dL / Ketone: x  / Bili: x / Urobili: x   Blood: x / Protein: x / Nitrite: x   Leuk Esterase: x / RBC: x / WBC x   Sq Epi: x / Non Sq Epi: x / Bacteria: x            Imaging:           Gastroenterology/Hepatology Progress Note    Interval Events:   - patient reports feeling better   - no complaints at this time     Allergies:  penicillin (Unknown)      Hospital Medications:  amLODIPine   Tablet 5 milliGRAM(s) Oral daily  anagrelide 1 milliGRAM(s) Oral daily  aspirin enteric coated 81 milliGRAM(s) Oral daily  dextrose 5%. 1000 milliLiter(s) IV Continuous <Continuous>  dextrose 5%. 1000 milliLiter(s) IV Continuous <Continuous>  dextrose 50% Injectable 25 Gram(s) IV Push once  dextrose 50% Injectable 25 Gram(s) IV Push once  dextrose 50% Injectable 12.5 Gram(s) IV Push once  dextrose Oral Gel 15 Gram(s) Oral once PRN  glucagon  Injectable 1 milliGRAM(s) IntraMuscular once  insulin lispro (ADMELOG) corrective regimen sliding scale   SubCutaneous at bedtime  insulin lispro (ADMELOG) corrective regimen sliding scale   SubCutaneous three times a day before meals  losartan 100 milliGRAM(s) Oral daily  melatonin 3 milliGRAM(s) Oral at bedtime PRN  metoprolol succinate  milliGRAM(s) Oral daily      ROS: 14 point ROS negative unless otherwise state in subjective    PHYSICAL EXAM:   Vital Signs:  Vital Signs Last 24 Hrs  T(C): 36.5 (08 Dec 2023 06:42), Max: 37.3 (07 Dec 2023 21:49)  T(F): 97.7 (08 Dec 2023 06:42), Max: 99.2 (07 Dec 2023 21:49)  HR: 80 (08 Dec 2023 06:42) (80 - 107)  BP: 183/83 (08 Dec 2023 06:42) (104/55 - 183/83)  BP(mean): --  RR: 18 (08 Dec 2023 06:42) (18 - 22)  SpO2: 99% (08 Dec 2023 06:42) (96% - 100%)    Parameters below as of 08 Dec 2023 06:42  Patient On (Oxygen Delivery Method): room air      Daily     Daily     GENERAL:  No acute distress  HEENT:  NCAT, no scleral icterus  CHEST: no resp distress  HEART:  RRR  ABDOMEN:  Soft, non-tender, non-distended, no masses  EXTREMITIES:  No cyanosis, clubbing, or edema  SKIN:  No rash/erythema/ecchymoses/petechiae/wounds/abscess/warm/dry  NEURO:  Alert and oriented x 3    LABS:                        9.0    6.98  )-----------( 290      ( 08 Dec 2023 07:31 )             27.0     Mean Cell Volume: 78.7 fL (12-08-23 @ 07:31)    12-08    142  |  106  |  10  ----------------------------<  124<H>  3.8   |  27  |  0.68    Ca    8.6      08 Dec 2023 07:31  Phos  2.6     12-08  Mg     2.30     12-08          Urinalysis Basic - ( 08 Dec 2023 07:31 )    Color: x / Appearance: x / SG: x / pH: x  Gluc: 124 mg/dL / Ketone: x  / Bili: x / Urobili: x   Blood: x / Protein: x / Nitrite: x   Leuk Esterase: x / RBC: x / WBC x   Sq Epi: x / Non Sq Epi: x / Bacteria: x    < from: Colonoscopy (12.07.23 @ 10:00) >  Impression:          - No signs of active bleeding. Source of hematochezia                        likely from proximal diverticular bleed, which has                        resolved.                       - Diverticulosis in the entire examined colon.                       - Internal hemorrhoids.                       - The examination was otherwise normal on direct and                        retroflexion views.                       - The examined portion of the ileum was normal.                 - No specimens collected.    < end of copied text >

## 2023-12-08 NOTE — DISCHARGE NOTE PROVIDER - HOSPITAL COURSE
79 yo female with PMH significant for but not limited to HTN/HLD, diverticulitis, anemia, ?thrombocytosis on ASA 81mg /Anagrelide who is presenting as a transfer from [facility] for [facility] found to have active extravasation on CTA abdomen into cecum, now is s/p colonoscopy .    GI bleed.   ·  Plan: Presenting with active GI hemorrhage with CTA showing active contrast extravasation terminal ileum/cecum.  - Hgb rapidly dropping 10.4->7.0 consistent with active bleeding   - s/p transfusion  2U PRBC, 1U platelets.   - consider IVF after transfusion   - maintain two large bore IVs   - GI/IR consulted, f/u recs , s/p colonoscopy - No signs of active bleeding. Source of hematochezia likely from proximal diverticular bleed, which has resolved.  - resume  ASA, anagrelide  - resume diet   - monitor CBCs qdaily unless bleeding.     Problem/Plan - 2:  ·  Problem: Hypertension.   ·  Plan: Home medications: tribenzor (olmasartan/amlodipine/HCTZ) 40/5/12.5, metoprolol  daily   -resume  home antihypertensives   - monitor BP,     Problem/Plan - 3:  ·  Problem: Type 2 diabetes mellitus.   ·  Plan: A1c pending   - home medication: metformin 500 daily   - FS q6H while NPO, low dose ISS.     Problem/Plan - 4:  ·  Problem: Thrombocythemia.   ·  Plan: Follows with hematologist - Jaden Shukla  - resuming  anagrelide and ASA.     Problem/Plan - 5:  ·  Problem: Prophylactic measure.   ·  Plan: DVTppx: SCDs, for now   Dispo: PT consult  Advance diet   DC planning.       77 yo female with PMH significant for but not limited to HTN/HLD, diverticulitis, anemia, ?thrombocytosis on ASA 81mg /Anagrelide who is presenting as a transfer from [facility] for [facility] found to have active extravasation on CTA abdomen into cecum, now is s/p colonoscopy .    GI bleed.   ·  Plan: Presenting with active GI hemorrhage with CTA showing active contrast extravasation terminal ileum/cecum.  - Hgb rapidly dropping 10.4->7.0 consistent with active bleeding   - s/p transfusion  2U PRBC, 1U platelets.   - consider IVF after transfusion   - maintain two large bore IVs   - GI/IR consulted, f/u recs , s/p colonoscopy - No signs of active bleeding. Source of hematochezia likely from proximal diverticular bleed, which has resolved.  - resume  ASA, anagrelide  - resume diet   - monitor CBCs qdaily unless bleeding.     Problem/Plan - 2:  ·  Problem: Hypertension.   ·  Plan: Home medications: tribenzor (olmasartan/amlodipine/HCTZ) 40/5/12.5, metoprolol  daily   -resume  home antihypertensives   - monitor BP,     Problem/Plan - 3:  ·  Problem: Type 2 diabetes mellitus.   ·  Plan: A1c pending   - home medication: metformin 500 daily   - FS q6H while NPO, low dose ISS.     Problem/Plan - 4:  ·  Problem: Thrombocythemia.   ·  Plan: Follows with hematologist - Jaden Shukla  - resuming  anagrelide and ASA.     Problem/Plan - 5:  ·  Problem: Prophylactic measure.   ·  Plan: DVTppx: SCDs, for now   Dispo: PT consult  Advance diet   DC planning.       77 yo female with PMH significant for but not limited to HTN/HLD, diverticulitis, anemia, ?thrombocytosis on ASA 81mg /Anagrelide who is presenting as a transfer from [facility] for [facility] found to have active extravasation on CTA abdomen into cecum, now is s/p colonoscopy .    GI bleed.   ·  Plan: Presenting with active GI hemorrhage with CTA showing active contrast extravasation terminal ileum/cecum.  - Hgb rapidly dropping 10.4->7.0 consistent with active bleeding   - s/p transfusion  2U PRBC, 1U platelets.   - consider IVF after transfusion   - maintain two large bore IVs   - GI/IR consulted, f/u recs , s/p colonoscopy - No signs of active bleeding. Source of hematochezia likely from proximal diverticular bleed, which has resolved.  - resume  ASA, anagrelide  - resume diet   - monitor CBCs qdaily unless bleeding.     Problem/Plan - 2:  ·  Problem: Hypertension.   ·  Plan: Home medications: tribenzor (olmasartan/amlodipine/HCTZ) 40/5/12.5, metoprolol  daily   -resume  home antihypertensives   - monitor BP,     Problem/Plan - 3:  ·  Problem: Type 2 diabetes mellitus.   ·  Plan: A1c pending   - home medication: metformin 500 daily   - FS q6H while NPO, low dose ISS.     Problem/Plan - 4:  ·  Problem: Thrombocythemia.   ·  Plan: Follows with hematologist - Jaden Shukla  - resuming  anagrelide and ASA.     Problem/Plan - 5:  ·  Problem: Prophylactic measure.   ·  Plan: DVTppx: SCDs, for now   Dispo: PT consult  Advance diet   DC planning, has Hematology eval next Friday, F/w GI outpatient        79 yo female with PMH significant for but not limited to HTN/HLD, diverticulitis, anemia, ?thrombocytosis on ASA 81mg /Anagrelide who is presenting as a transfer from [facility] for [facility] found to have active extravasation on CTA abdomen into cecum, now is s/p colonoscopy .    GI bleed.   ·  Plan: Presenting with active GI hemorrhage with CTA showing active contrast extravasation terminal ileum/cecum.  - Hgb rapidly dropping 10.4->7.0 consistent with active bleeding   - s/p transfusion  2U PRBC, 1U platelets.   - consider IVF after transfusion   - maintain two large bore IVs   - GI/IR consulted, f/u recs , s/p colonoscopy - No signs of active bleeding. Source of hematochezia likely from proximal diverticular bleed, which has resolved.  - resume  ASA, anagrelide  - resume diet   - monitor CBCs qdaily unless bleeding.     Problem/Plan - 2:  ·  Problem: Hypertension.   ·  Plan: Home medications: tribenzor (olmasartan/amlodipine/HCTZ) 40/5/12.5, metoprolol  daily   -resume  home antihypertensives   - monitor BP,     Problem/Plan - 3:  ·  Problem: Type 2 diabetes mellitus.   ·  Plan: A1c pending   - home medication: metformin 500 daily   - FS q6H while NPO, low dose ISS.     Problem/Plan - 4:  ·  Problem: Thrombocythemia.   ·  Plan: Follows with hematologist - Jaden Shukla  - resuming  anagrelide and ASA.     Problem/Plan - 5:  ·  Problem: Prophylactic measure.   ·  Plan: DVTppx: SCDs, for now   Dispo: PT consult  Advance diet   DC planning, has Hematology eval next Friday, F/w GI outpatient

## 2023-12-08 NOTE — DISCHARGE NOTE PROVIDER - NSDCCPTREATMENT_GEN_ALL_CORE_FT
PRINCIPAL PROCEDURE  Procedure: Colonoscopy  Findings and Treatment: Impression:    - No signs of active bleeding. Source of hematochezia                        likely from proximal diverticular bleed, which has                        resolved.                       - Diverticulosis in the entire examined colon.                       - Internal hemorrhoids.                       - The examination was otherwise normal on direct and                        retroflexion views.                       - The examined portion of the ileum was normal.                       - No specimens collected.  Recommendation:      - Discharge patient to home (ambulatory).                       - Resume regular diet today.                       - No further inpatient GI workup planned.                       - If recurrent bleeding, suggest IR consultation for                        possible embolization.

## 2023-12-08 NOTE — DISCHARGE NOTE PROVIDER - NSDCMRMEDTOKEN_GEN_ALL_CORE_FT
ANAGRELIDE 1MG CAPSULES: TAKE 1 CAPSULE BY MOUTH TWICE DAILY  aspirin 81 mg oral tablet: 1 tab(s) orally once a day  FERATE 27 MG TABLET:   METFORMIN 500MG TABLETS: TAKE 1 TABLET BY MOUTH DAILY  METOPROLOL ER SUCCINATE 100MG TABS: TAKE 1 TABLET BY MOUTH DAILY FOR BLOOD PRESSURE AND HEART RATE  OLMSRTN-AMLDPN-HCTZ 40-5-12.5: TAKE 1 TABLET BY MOUTH EVERY DAY  SENNA 8.6 MG TABLET:

## 2023-12-08 NOTE — DISCHARGE NOTE NURSING/CASE MANAGEMENT/SOCIAL WORK - NSDCPEFALRISK_GEN_ALL_CORE
For information on Fall & Injury Prevention, visit: [email or web address] OR  [email or web address] OR  https://www.cdc.gov/steadi/patient.html For information on Fall & Injury Prevention, visit: [email or web address] OR  [email or web address] OR  https://www.cdc.gov/steadi/patient.html

## 2023-12-08 NOTE — DISCHARGE NOTE NURSING/CASE MANAGEMENT/SOCIAL WORK - PATIENT PORTAL LINK FT
You can access the FollowMyHealth Patient Portal offered by [facility] by registering at the following website: [email or web address]. By joining [email or web address]’s FollowMyHealth portal, you will also be able to view your health information using other applications (apps) compatible with our system. You can access the FollowMyHealth Patient Portal offered by [facility] by registering at the following website: [email or web address]. By joining "[employer]’s FollowMyHealth portal, you will also be able to view your health information using other applications (apps) compatible with our system.

## 2023-12-08 NOTE — PROGRESS NOTE ADULT - ATTENDING COMMENTS
S/p colonoscopy with ileal intubation yesterday without evidence of active bleeding, but with pancolonic diverticulosis (and unremarkable TI). Suspect likely resolved diverticular bleeding. No further GI interventions planned. Suggest IR consultation for embolization if recurrent bleeding. Additional recommendations as above.

## 2024-01-12 ENCOUNTER — APPOINTMENT (OUTPATIENT)
Dept: ORTHOPEDIC SURGERY | Facility: CLINIC | Age: 79
End: 2024-01-12
Payer: MEDICARE

## 2024-01-12 DIAGNOSIS — S93.401D SPRAIN OF UNSPECIFIED LIGAMENT OF RIGHT ANKLE, SUBSEQUENT ENCOUNTER: ICD-10-CM

## 2024-01-12 PROBLEM — K59.09 OTHER CONSTIPATION: Chronic | Status: ACTIVE | Noted: 2023-12-06

## 2024-01-12 PROBLEM — D75.839 THROMBOCYTOSIS, UNSPECIFIED: Chronic | Status: ACTIVE | Noted: 2023-12-06

## 2024-01-12 PROBLEM — I10 ESSENTIAL (PRIMARY) HYPERTENSION: Chronic | Status: ACTIVE | Noted: 2023-12-05

## 2024-01-12 PROBLEM — E11.9 TYPE 2 DIABETES MELLITUS WITHOUT COMPLICATIONS: Chronic | Status: ACTIVE | Noted: 2023-12-06

## 2024-01-12 PROCEDURE — 99213 OFFICE O/P EST LOW 20 MIN: CPT

## 2024-01-12 NOTE — HISTORY OF PRESENT ILLNESS
[4] : 4 [Sharp] : sharp [Tightness] : tightness [de-identified] : Patient returns for her right ankle sprain and peroneal tendinitis after she fell September 2023. Developed pain about a one week after fall. She continues to attend PT, 2x a week and it has been beneficial. She has been WB in sneakers and is using compression stockings for chronic swelling.  [] : no [FreeTextEntry1] : right ankle [FreeTextEntry6] : swelling

## 2024-01-12 NOTE — PHYSICAL EXAM
[Right] : right foot and ankle [NL (40)] : plantar flexion 40 degrees [NL 30)] : inversion 30 degrees [2+] : posterior tibialis pulse: 2+ [Normal] : saphenous nerve sensation normal [NL (20)] : eversion 20 degrees [5___] : eversion 5[unfilled]/5 [] : non-antalgic [FreeTextEntry3] : Baseline chronic swelling  (unchanged from prior).

## 2024-01-12 NOTE — DISCUSSION/SUMMARY
[de-identified] : Patient has recovered well, and can slowly resume activities as tolerated.  Home stretching exercises were encouraged to maintain flexibility. Ice/nsaids can be used as needed. Patient will follow up as needed.

## 2024-02-02 ENCOUNTER — APPOINTMENT (OUTPATIENT)
Dept: PAIN MANAGEMENT | Facility: CLINIC | Age: 79
End: 2024-02-02
Payer: MEDICARE

## 2024-02-02 VITALS — BODY MASS INDEX: 36.96 KG/M2 | WEIGHT: 230 LBS | HEIGHT: 66 IN

## 2024-02-02 DIAGNOSIS — M54.50 LOW BACK PAIN, UNSPECIFIED: ICD-10-CM

## 2024-02-02 DIAGNOSIS — M54.17 RADICULOPATHY, LUMBOSACRAL REGION: ICD-10-CM

## 2024-02-02 DIAGNOSIS — M54.16 RADICULOPATHY, LUMBAR REGION: ICD-10-CM

## 2024-02-02 PROCEDURE — 99213 OFFICE O/P EST LOW 20 MIN: CPT

## 2024-02-02 NOTE — HISTORY OF PRESENT ILLNESS
[Lower back] : lower back [6] : 6 [4] : 4 [Dull/Aching] : dull/aching [Radiating] : radiating [Tightness] : tightness [Meds] : meds [Physical therapy] : physical therapy [Walking] : walking [Stairs] : stairs [Intermittent] : intermittent [Household chores] : household chores [] : This patient has had an injection before: no [FreeTextEntry1] : b/l  [FreeTextEntry6] : heavyness [FreeTextEntry7] : (b/l) mostly left groin, thigh to the knee  [FreeTextEntry9] : meloxicam  [de-identified] : lifting  [de-identified] : L MRI

## 2024-02-02 NOTE — DISCUSSION/SUMMARY
[de-identified] : After discussing various treatment options with the patient including but not limited to oral medications, physical therapy, exercise modalities as well as interventional spinal injections, we have decided with the following plan:  - Continue home exercises, stretching, activity modification, physical therapy, and conservative care. - Follow-up as needed. - Will provide prescription for Physical Therapy. - Recommend continue Meloxicam 15mg daily PRN. Potential adverse effects including but not limited to bleeding, ulcers, increased risk of hypertension, heart disease, kidney disease and stroke were discussed with the patient.  Medication would allow patient to be more mobile and perform ADL's.  Will continue to monitor patient and attempt to wean as soon as possible. Will use the lowest dosage possible for the shortest possible period of time.

## 2024-02-02 NOTE — PHYSICAL EXAM
[de-identified] : Constitutional; Appears well, no apparent distress\par  Ability to communicate: Normal \par  Respiratory: non-labored breathing\par  Skin: No rash noted\par  Head: Normocephalic, atraumatic\par  Neck: no visible thyroid enlargement\par  Eyes: Extraocular movements intact\par  Neurologic: Alert and oriented x3\par  Psychiatric: normal mood, affect and behavior \par  \par   [] : no ecchymosis No

## 2024-03-27 NOTE — H&P ADULT - NSICDXNOFAMILYHX_GEN_ALL_CORE
Entire chest pain ("tightness across chest") x 3 weeks only when that has now turned into left sided chest pain (Pressure) that is also in the left shoulder (Stabbing) pain since last morning. Pt was seen by PMD and sent in to r/o Pericarditis. EKG done in triage <-- Click to add NO pertinent Family History

## 2024-04-19 ENCOUNTER — APPOINTMENT (OUTPATIENT)
Dept: ORTHOPEDIC SURGERY | Facility: CLINIC | Age: 79
End: 2024-04-19
Payer: MEDICARE

## 2024-04-19 PROCEDURE — 73562 X-RAY EXAM OF KNEE 3: CPT | Mod: RT

## 2024-04-19 PROCEDURE — 99213 OFFICE O/P EST LOW 20 MIN: CPT

## 2024-04-19 RX ORDER — TRAMADOL HYDROCHLORIDE 50 MG/1
50 TABLET, COATED ORAL
Qty: 60 | Refills: 0 | Status: ACTIVE | COMMUNITY
Start: 2024-04-19 | End: 1900-01-01

## 2024-05-24 ENCOUNTER — APPOINTMENT (OUTPATIENT)
Dept: ORTHOPEDIC SURGERY | Facility: CLINIC | Age: 79
End: 2024-05-24
Payer: MEDICARE

## 2024-05-24 DIAGNOSIS — M17.11 UNILATERAL PRIMARY OSTEOARTHRITIS, RIGHT KNEE: ICD-10-CM

## 2024-05-24 DIAGNOSIS — M76.71 PERONEAL TENDINITIS, RIGHT LEG: ICD-10-CM

## 2024-05-24 PROCEDURE — 99213 OFFICE O/P EST LOW 20 MIN: CPT

## 2024-05-24 NOTE — HISTORY OF PRESENT ILLNESS
[Sharp] : sharp [Tightness] : tightness [5] : 5 [de-identified] : Patient returns for her right ankle sprain and peroneal tendinitis after she fell September 2023. She continues to attend PT, 2x a week and it has been beneficial. She has been WB in sneakers and is using compression stockings for chronic swelling. She has been having increasing pain in her right knee, known history of knee arthritis. She was prescribed tramadol but has not needed it as she has found voltaren helpful for her knee (not applying it on ankle). Reports ankle has been feeling worse as she has been walking more for exercise.   [] : no [FreeTextEntry1] : right ankle [FreeTextEntry6] : swelling

## 2024-05-24 NOTE — DISCUSSION/SUMMARY
[de-identified] : WBAT in supportive footwear. Ice/heat to affected area. She will use ankle brace prn which she has at home. She will apply voltaren gel as directed to the ankle as it has helped her knee.

## 2024-05-24 NOTE — PHYSICAL EXAM
[NL (0)] : extension 0 degrees [AP] : anteroposterior [Lateral] : lateral [Carney] : skyline [Right] : right foot and ankle [NL (20)] : dorsiflexion 20 degrees [NL (40)] : plantar flexion 40 degrees [5___] : inversion 5[unfilled]/5 [2+] : posterior tibialis pulse: 2+ [Normal] : saphenous nerve sensation normal [FreeTextEntry9] : Severe degenerative changes lateral joint line, unchanged compared to previous films.  [TWNoteComboBox7] : flexion 105 degrees [4___] : eversion 4[unfilled]/5 [] : mildly antalgic [FreeTextEntry3] : Baseline chronic swelling  (unchanged from prior). [de-identified] : inversion 20 degrees [de-identified] : eversion 15 degrees

## 2024-07-05 ENCOUNTER — APPOINTMENT (OUTPATIENT)
Dept: ORTHOPEDIC SURGERY | Facility: CLINIC | Age: 79
End: 2024-07-05
Payer: MEDICARE

## 2024-07-05 DIAGNOSIS — S93.401D SPRAIN OF UNSPECIFIED LIGAMENT OF RIGHT ANKLE, SUBSEQUENT ENCOUNTER: ICD-10-CM

## 2024-07-05 DIAGNOSIS — M76.71 PERONEAL TENDINITIS, RIGHT LEG: ICD-10-CM

## 2024-07-05 PROCEDURE — 99213 OFFICE O/P EST LOW 20 MIN: CPT

## 2024-08-30 ENCOUNTER — APPOINTMENT (OUTPATIENT)
Dept: ORTHOPEDIC SURGERY | Facility: CLINIC | Age: 79
End: 2024-08-30
Payer: MEDICARE

## 2024-08-30 DIAGNOSIS — M76.821 POSTERIOR TIBIAL TENDINITIS, RIGHT LEG: ICD-10-CM

## 2024-08-30 PROCEDURE — L1902: CPT | Mod: KX,RT

## 2024-08-30 PROCEDURE — 99213 OFFICE O/P EST LOW 20 MIN: CPT

## 2024-08-30 PROCEDURE — 73610 X-RAY EXAM OF ANKLE: CPT | Mod: RT

## 2024-08-30 NOTE — HISTORY OF PRESENT ILLNESS
[5] : 5 [Sharp] : sharp [Tightness] : tightness [de-identified] : Patient returns for her right ankle sprain and peroneal tendinitis after she fell September 2023.  She has been WB in sneakers and is using compression stockings and sleeve for chronic swelling which have been helpful.  She uses the voltaren gel intermittently. She reports some ankle pain, but not severe. Ambulating with a cane. She fell late July 2024 and aggravated her medial ankle.  [] : no [FreeTextEntry1] : right ankle [FreeTextEntry6] : swelling

## 2024-08-30 NOTE — DISCUSSION/SUMMARY
[de-identified] : WBAT in supportive footwear, air lift brace.   Recommend a course of PT. Ice to affected area. Stretching exercises recommended and demonstrated to patient.   NSAIDS prn.

## 2024-08-30 NOTE — PHYSICAL EXAM
[NL (20)] : dorsiflexion 20 degrees [5___] : dorsiflexion 5[unfilled]/5 [2+] : posterior tibialis pulse: 2+ [Normal] : saphenous nerve sensation normal [4___] : eversion 4[unfilled]/5 [] : ambulation with cane [Right] : right ankle [Weight -] : weightbearing [There are no fractures, subluxations or dislocations. No significant abnormalities are seen] : There are no fractures, subluxations or dislocations. No significant abnormalities are seen [FreeTextEntry3] : Baseline chronic swelling  (unchanged from prior). [FreeTextEntry8] : ttp medial distal tibia.  [de-identified] : plantar flexion 30 degrees [de-identified] : inversion 20 degrees [de-identified] : eversion 15 degrees

## 2024-10-11 ENCOUNTER — APPOINTMENT (OUTPATIENT)
Dept: ORTHOPEDIC SURGERY | Facility: CLINIC | Age: 79
End: 2024-10-11

## (undated) DEVICE — DRSG BANDAID 0.75X3"

## (undated) DEVICE — BIOPSY FORCEP RADIAL JAW 4 STANDARD WITH NEEDLE

## (undated) DEVICE — BIOPSY FORCEP COLD DISP

## (undated) DEVICE — ELCTR ECG CONDUCTIVE ADHESIVE

## (undated) DEVICE — DRSG CURITY GAUZE SPONGE 4 X 4" 12-PLY NON-STERILE

## (undated) DEVICE — PACK IV START WITH CHG

## (undated) DEVICE — ELCTR GROUNDING PAD ADULT COVIDIEN

## (undated) DEVICE — BASIN EMESIS 10IN GRADUATED MAUVE

## (undated) DEVICE — CONTAINER FORMALIN 80ML YELLOW

## (undated) DEVICE — SALIVA EJECTOR (BLUE)

## (undated) DEVICE — CATH IV SAFE BC 22G X 1" (BLUE)

## (undated) DEVICE — TUBING IV SET GRAVITY 3Y 100" MACRO

## (undated) DEVICE — TUBING MEDI-VAC W MAXIGRIP CONNECTORS 1/4"X6'

## (undated) DEVICE — LINE BREATHE SAMPLNG

## (undated) DEVICE — LUBRICATING JELLY HR ONE SHOT 3G

## (undated) DEVICE — GOWN LG

## (undated) DEVICE — TUBING SUCTION NONCONDUCTIVE 6MM X 12FT

## (undated) DEVICE — DRSG 2X2